# Patient Record
Sex: FEMALE | Employment: OTHER | URBAN - METROPOLITAN AREA
[De-identification: names, ages, dates, MRNs, and addresses within clinical notes are randomized per-mention and may not be internally consistent; named-entity substitution may affect disease eponyms.]

---

## 2019-02-06 ENCOUNTER — OFFICE VISIT (OUTPATIENT)
Dept: OBGYN | Facility: CLINIC | Age: 34
End: 2019-02-06
Attending: STUDENT IN AN ORGANIZED HEALTH CARE EDUCATION/TRAINING PROGRAM
Payer: COMMERCIAL

## 2019-02-06 VITALS
BODY MASS INDEX: 22.99 KG/M2 | SYSTOLIC BLOOD PRESSURE: 118 MMHG | DIASTOLIC BLOOD PRESSURE: 72 MMHG | HEIGHT: 67 IN | HEART RATE: 73 BPM | WEIGHT: 146.5 LBS

## 2019-02-06 DIAGNOSIS — Z12.4 CERVICAL CANCER SCREENING: ICD-10-CM

## 2019-02-06 DIAGNOSIS — R45.89 SADNESS: ICD-10-CM

## 2019-02-06 DIAGNOSIS — N85.2 LARGE UTERUS: Primary | ICD-10-CM

## 2019-02-06 DIAGNOSIS — Z11.3 ROUTINE SCREENING FOR STI (SEXUALLY TRANSMITTED INFECTION): ICD-10-CM

## 2019-02-06 DIAGNOSIS — Z23 NEED FOR PROPHYLACTIC VACCINATION AND INOCULATION AGAINST INFLUENZA: ICD-10-CM

## 2019-02-06 DIAGNOSIS — Z00.00 VISIT FOR PREVENTIVE HEALTH EXAMINATION: ICD-10-CM

## 2019-02-06 DIAGNOSIS — Z31.69 ENCOUNTER FOR PRECONCEPTION CONSULTATION: ICD-10-CM

## 2019-02-06 DIAGNOSIS — Z00.00 ENCOUNTER FOR ROUTINE ADULT HEALTH EXAMINATION WITHOUT ABNORMAL FINDINGS: ICD-10-CM

## 2019-02-06 PROCEDURE — 25000128 H RX IP 250 OP 636: Mod: ZF

## 2019-02-06 PROCEDURE — G0145 SCR C/V CYTO,THINLAYER,RESCR: HCPCS | Performed by: STUDENT IN AN ORGANIZED HEALTH CARE EDUCATION/TRAINING PROGRAM

## 2019-02-06 PROCEDURE — 87624 HPV HI-RISK TYP POOLED RSLT: CPT | Performed by: OBSTETRICS & GYNECOLOGY

## 2019-02-06 PROCEDURE — G0008 ADMIN INFLUENZA VIRUS VAC: HCPCS | Mod: ZF

## 2019-02-06 PROCEDURE — 87591 N.GONORRHOEAE DNA AMP PROB: CPT | Performed by: STUDENT IN AN ORGANIZED HEALTH CARE EDUCATION/TRAINING PROGRAM

## 2019-02-06 PROCEDURE — 90686 IIV4 VACC NO PRSV 0.5 ML IM: CPT | Mod: ZF

## 2019-02-06 PROCEDURE — 87661 TRICHOMONAS VAGINALIS AMPLIF: CPT | Performed by: STUDENT IN AN ORGANIZED HEALTH CARE EDUCATION/TRAINING PROGRAM

## 2019-02-06 PROCEDURE — 87491 CHLMYD TRACH DNA AMP PROBE: CPT | Performed by: STUDENT IN AN ORGANIZED HEALTH CARE EDUCATION/TRAINING PROGRAM

## 2019-02-06 PROCEDURE — G0463 HOSPITAL OUTPT CLINIC VISIT: HCPCS | Mod: 25

## 2019-02-06 RX ORDER — PRENATAL VIT/IRON FUM/FOLIC AC 27MG-0.8MG
1 TABLET ORAL DAILY
Qty: 90 TABLET | Refills: 3 | Status: SHIPPED | OUTPATIENT
Start: 2019-02-06 | End: 2020-02-17

## 2019-02-06 SDOH — HEALTH STABILITY: MENTAL HEALTH: HOW MANY STANDARD DRINKS CONTAINING ALCOHOL DO YOU HAVE ON A TYPICAL DAY?: 3 OR 4

## 2019-02-06 SDOH — HEALTH STABILITY: PHYSICAL HEALTH: ON AVERAGE, HOW MANY MINUTES DO YOU ENGAGE IN EXERCISE AT THIS LEVEL?: 60 MIN

## 2019-02-06 SDOH — SOCIAL STABILITY: SOCIAL NETWORK
IN A TYPICAL WEEK, HOW MANY TIMES DO YOU TALK ON THE PHONE WITH FAMILY, FRIENDS, OR NEIGHBORS?: MORE THAN THREE TIMES A WEEK

## 2019-02-06 SDOH — SOCIAL STABILITY: SOCIAL NETWORK: ARE YOU MARRIED, WIDOWED, DIVORCED, SEPARATED, NEVER MARRIED, OR LIVING WITH A PARTNER?: MARRIED

## 2019-02-06 SDOH — HEALTH STABILITY: MENTAL HEALTH: HOW OFTEN DO YOU HAVE 6 OR MORE DRINKS ON ONE OCCASION?: LESS THAN MONTHLY

## 2019-02-06 SDOH — HEALTH STABILITY: MENTAL HEALTH: HOW OFTEN DO YOU HAVE A DRINK CONTAINING ALCOHOL?: 4 OR MORE TIMES A WEEK

## 2019-02-06 SDOH — SOCIAL STABILITY: SOCIAL NETWORK: HOW OFTEN DO YOU ATTEND CHURCH OR RELIGIOUS SERVICES?: NEVER

## 2019-02-06 SDOH — SOCIAL STABILITY: SOCIAL NETWORK
DO YOU BELONG TO ANY CLUBS OR ORGANIZATIONS SUCH AS CHURCH GROUPS UNIONS, FRATERNAL OR ATHLETIC GROUPS, OR SCHOOL GROUPS?: NO

## 2019-02-06 SDOH — HEALTH STABILITY: PHYSICAL HEALTH: ON AVERAGE, HOW MANY DAYS PER WEEK DO YOU ENGAGE IN MODERATE TO STRENUOUS EXERCISE (LIKE A BRISK WALK)?: 2 DAYS

## 2019-02-06 SDOH — SOCIAL STABILITY: SOCIAL NETWORK: HOW OFTEN DO YOU GET TOGETHER WITH FRIENDS OR RELATIVES?: NEVER

## 2019-02-06 SDOH — SOCIAL STABILITY: SOCIAL NETWORK: HOW OFTEN DO YOU ATTENT MEETINGS OF THE CLUB OR ORGANIZATION YOU BELONG TO?: NEVER

## 2019-02-06 ASSESSMENT — PATIENT HEALTH QUESTIONNAIRE - PHQ9
SUM OF ALL RESPONSES TO PHQ QUESTIONS 1-9: 19
5. POOR APPETITE OR OVEREATING: SEVERAL DAYS

## 2019-02-06 ASSESSMENT — ANXIETY QUESTIONNAIRES
GAD7 TOTAL SCORE: 11
5. BEING SO RESTLESS THAT IT IS HARD TO SIT STILL: NOT AT ALL
7. FEELING AFRAID AS IF SOMETHING AWFUL MIGHT HAPPEN: SEVERAL DAYS
3. WORRYING TOO MUCH ABOUT DIFFERENT THINGS: NEARLY EVERY DAY
2. NOT BEING ABLE TO STOP OR CONTROL WORRYING: NEARLY EVERY DAY
6. BECOMING EASILY ANNOYED OR IRRITABLE: SEVERAL DAYS
1. FEELING NERVOUS, ANXIOUS, OR ON EDGE: MORE THAN HALF THE DAYS

## 2019-02-06 ASSESSMENT — MIFFLIN-ST. JEOR: SCORE: 1406.02

## 2019-02-06 NOTE — NURSING NOTE
FLU VACCINE QUESTIONNAIRE:  Ask the following questions of all parties who want influenza vaccination:     CONTRAINDICATIONS  1.  Is the patient age less than 6 months?  NO  2.  Has the person to be vaccinated ever had Guillain-Needham syndrome? NO  3.  Has the person to be vaccinated had the vaccine this year? NO  4.  Is the person to be vaccinated sick today? NO  5.  Does the person to be vaccinated have an allergy to eggs or a component of the vaccine? NO  6.  Has the person to vaccinated ever had a serious reaction to an influenza vaccination in the past? NO                             INFLUENZA VACCINATION NOTE      Information sheet given to patient and questions answered.

## 2019-02-06 NOTE — PATIENT INSTRUCTIONS
prescription for prenatal vitamins.  Timed intercourse using phone jake for cycle tracking. Can use ovulation predictor kits.  Schedule ultrasound and repeat appointment on 3/14/19.   Make an appointment to see a counselor. [email or web address]       PREVENTIVE HEALTH RECOMMENDATIONS:   Most women need a yearly breast and pelvic exam.    A PAP screen, a test done DURING a pelvic exam, is NO longer recommended yearly.    March 2013, screening guidelines recommended by ACOG for PAP screen are:    1) First pap at age 21.    2) Pap every 3 years until age 30.    3) After age 30, pap every 3 years or Pap with HR HPV screen every 5 years until age 65.  4) Women do NOT need a vaginal Pap screen after a hysterectomy (surgical removal of the uterus) when they have not had cancer.    Exceptions:  1) Yearly pap if HIV+ or immunosuppressed secondary to organ transplant  2) ERIN II-III continue routine screening for 20 years.    I encourage you continue looking for opportunities to choose a healthy lifestyle:       * Choose to eat a heart healthy diet. Check out the FOOD PLATE guidelines at: http://www.choosemyplate.gov/ for helpful hints on weight and cholesterol management.  Balance your caloric intake with exercise to maintain a BMI in the 22 to 26 range. For bone health: Eat calcium-rich foods like yogurt, broccoli or take chewable calcium pills (500 to 600 mg) twice a day with food.       * Exercise for at least an average of 30 minutes a day, 5 days of the week. This will help you control your weight, release stress, and help prevent disease.      * Take a Vitamin D3 supplement daily fall through spring and during summer unless you [id number]' full body sun exposure to skin without sunscreen.      * DO wear sunscreen to prevent skin cancer after the first 15-30 minutes.      * Identify stressors in your life, find ways to release the stress, and, make time for yourself. PLEASE ask for help if mood  changes last longer than two weeks.     * Limit alcohol to one drink per day.  No smoking.  Avoid second hand smoke. If you smoke, ask for help to stop.       *  If you are in a sexual relationship, talk with your partner about possible infection risks and take action to protect yourself from exposure to a sexual infection.    Please request an infection screen for STIs (sexually transmitted infections) if you are less than age 26 OR believe that you may be at risk.     Get a flu shot each year. Get a tetanus shot every 10 years. EVERYONE needs a pertussis (Whooping cough) booster.    See your dentist twice a year for an exam and preventive care cleaning.     Consider the following screen tests:    1) cholesterol test every 5 years.     2) yearly mammogram after age 40 unless you have identified risks.    3) colonoscopy every 10 years after age 50 unless you have identified risks.    4) diabetes blood test screening if you are at risk for diabetes.      Additional information that you may also find helpful:  The Internet now gives us access to LOTS of information -- some of it helpful, research documented and also plenty of harmful, anecdotal information that may not pertain to your situtaion. Consider visiting the following websites for accurate health information:    www.vitamindcouncil.org/ : Info and ongoing research re Vitamin D    www.fairview.org : Up to date and easily searchable information on multiple topics.    www.medlineplus.gov : medication info, interactive tutorials, watch real surgeries online    www.cdc.gov : public health info, travel advisories, epidemics (H1N1)    www.artis/std.org: current research re diagnosis, treatment and prevention of sexually contacted infections.    www.health.[facility].[place] : [place] dept of heatlh, public health issues in [place], N1N1    www.familydoctor.org : good info from the Academy of Family Physicians

## 2019-02-06 NOTE — PROGRESS NOTES
"[facility]  Annual Exam    HPI:    John Guaman is a 33 year old , here for well woman exam.  She is here with her . Her main concerns today revolve around her lack of medical care in [place] and preconception/fertility. She overall is unsure of her health history including specifics of surgeries, immunizations, etc. [place] the [place] and has been having unprotected intercourse since then. She has just been starting to track her cycles. Her  is 57 and has two children who are in their 20s and 30s. She has had one pregnancy that was terminated. She has regular monthly cycles that last 4-5 days, normal flow. She had some sort of gyn surgery at the same time as an appendectomy. She thinks it might have been for \"a cyst.\"      GYN History  - LMP: Patient's last menstrual period was 2019 (exact date).  - Menses: cycles q month, lasting 4-5 days, with moderate flow  - Pap Smears: Possible history of one previous pap smear, unsure of results  - Contraception: never used  - Sexual Activity: currently having unprotected sex with usband  - Hx STIs: denies h/o chlamydia, gonorrhea, syphilis, hepatitis, HIV  - Hx UTIs: unsure    OBHx  Obstetric History       T0      L0     SAB0   TAB0   Ectopic0   Multiple0   Live Births0       # Outcome Date GA Lbr Antwon/2nd Weight Sex Delivery Anes PTL Lv   1 AB                   PMHx: Denies    PSHx:   Likely suction D&C for induced Ab  Open appendectomy and gyn surgery    Meds: Occasional multivitamins    Allergies:  NKDA    SocHx: Former smoker, does drink up to 16 drinks/week, no drugs  Immigrated from [place] (from [place], lived in [place] 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 Patient reports feeling worried about everything she has to do and sadness regarding her mother's death and her \"bad life.\"    Diet: Overall good  Exercise: Tries to walk and run when able    Physical Exam  /72 (BP Location: Right arm, Patient Position: Chair)   Pulse 73   Ht 1.7 m (5' 6.93\")   Wt 66.5 kg (146 lb 8 oz)   LMP 2019 (Exact Date)   Breastfeeding? No   BMI 22.99 kg/m    Gen: Well-appearing, NAD  HEENT: Normocephalic, atraumatic  Neck: Thyroid is not enlarged, no appreciable masses palpated. Non-tender  CV:  RRR, no m/r/g auscultated  Pulm: CTAB, no w/r/r auscultated  Abd: Well-healed scars x 3, ~4 cm infraumbilical midline and ~4cm RLQ, Soft, non-tender, non-distended  Ext: No LE edema, extremities warm and well perfused    Breast: Symmetric, no nipple discharge or retraction, no axillary lymphadenopathy, no palpable nodules or masses bilaterally, ~1 mm skin tag on right nipple    Pelvic:  Normal appearing external female genitalia. Normal hair distribution. Vagina is without lesions. Minimal white discharge. Cervix nulliparous, no lesions, no cervical motion tenderness. Uterus is ~12 wk size, out of the pelvis, mobile, non-tender. No adnexal tenderness or masses      --Ideal BMI: 18.5-24.9  Current BMI: Body mass index is 22.99 kg/m .  --Underweight = <18.5  --Normal weight = 18.5-24.9  --Overweight = 25-29.9  --Obesity = >30    Assessment/Plan  John Guaman is a 33 year old  female here for annual exam. Concerns include catching up on health maintenance, pregnancy planning, and depression.    1. Routine Health Maintenance:   - Offered breast exam.  No concerning findings on pelvic or breast exam.  Discussed SBE recommendations.   - Counseled patient regarding diet and exercise including Vit D and calcium intake, aerobic exercise, and weight maintenance.    - Immunizations: influenza " done today, discussed Gardisil recommendations-encouraged pt/ to talk to their insurance to see if it is covered. As patient is going through immigration process, she will likely need to catch up on immunizations. Informed patient to call for an RN visit when they know what she needs.   - GC/CT/Trich PCR     2. Cervical cancer screening:  - Pap w/Co-testing done today    3. Depression/Anxiety   -  I offered referral for therapy, patient agreeable. Information given. Discussed possibility of starting antidepressant/anti-anxiety medicine in the future if needed.    4. Pregnancy planning  - discussed expected fertility at patient's age and that we generally do not recommend testing until diagnosis of infertility made (no pregnancy after 1 year of unprotected intercourse.) Could consider earlier SA as partner is 57.   - encouraged fertility tracking jake such as [employer] and OPKs if desired  - rx for PNV sent   - will get pelvic ultrasound (see below)    5. Enlarged uterus/hx of gyn surgery  - due to enlarged uterus on exam today and history of unknown gyn surgery, pelvic ultrasound ordered to evaluate gyn anatomy and look to see if both ovaries still present, look for fibroids, etc.     Follow Up: after pelvic ultrasound to discuss results    Patient staffed with Dr. King    Call with results.     Oswaldo Carrillo MD  Ob/Gyn, PGY-2  2/7/2019 2:31 PM    The Patient was seen in [facility] by OSWALDO CARRILLO.  I reviewed the history & exam. Assessment and plan were jointly made.    Celia King MD

## 2019-02-06 NOTE — NURSING NOTE
Chief Complaint   Patient presents with     hospitals Care     Est. care/annual today. Will like to discuss previous surgery that she had. Doesn't remember the procedure. Also wants to know if she's able to get pregnant.       See WARREN Sotomayor 2/6/2019

## 2019-02-06 NOTE — LETTER
"2019       RE: John Guaman  [address]  [facility] [address]     Dear Colleague,    Thank you for referring your patient, John Guaman, to the [facility] at [facility]. Please see a copy of my visit note below.    [facility]  Annual Exam    HPI:    John Guaman is a 33 year old , here for well woman exam.  She is here with her . Her main concerns today revolve around her lack of medical care in her home country of [place] [place] [facility] and preconception/fertility. She overall is unsure of her health history including specifics of surgeries, immunizations, etc. [place] and has been having unprotected intercourse since then. She has just been starting to track her cycles. Her  is 57 and has two children who are in their 20s and 30s. She has had one pregnancy that was terminated. She has regular monthly cycles that last 4-5 days, normal flow. She had some sort of gyn surgery at the same time as an appendectomy. She thinks it might have been for \"a cyst.\"      GYN History  - LMP: Patient's last menstrual period was 2019 (exact date).  - Menses: cycles q month, lasting 4-5 days, with moderate flow  - Pap Smears: Possible history of one previous pap smear, unsure of results  - Contraception: never used  - Sexual Activity: currently having unprotected sex with usband  - Hx STIs: denies h/o chlamydia, gonorrhea, syphilis, hepatitis, HIV  - Hx UTIs: unsure    OBHx  Obstetric History       T0      L0     SAB0   TAB0   Ectopic0   Multiple0   Live Births0       # Outcome Date GA Lbr Antwon/2nd Weight Sex Delivery Anes PTL Lv   1 AB                   PMHx: Denies    PSHx:   Likely suction D&C for induced Ab  Open appendectomy and gyn surgery    Meds: Occasional multivitamins    Allergies:  NKDA    SocHx: Former smoker, does drink up to 16 drinks/week, no drugs  Immigrated from [place] (from " [place], lived in [place],) 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 Patient reports feeling worried about everything she has to do and sadness regarding her mother's death and her \"bad life.\"    Diet: Overall good  Exercise: Tries to walk and run when able    Physical Exam  /72 (BP Location: Right arm, Patient Position: Chair)   Pulse 73   Ht 1.7 m (5' 6.93\")   Wt 66.5 kg (146 lb 8 oz)   LMP 2019 (Exact Date)   Breastfeeding? No   BMI 22.99 kg/m     Gen: Well-appearing, NAD  HEENT: Normocephalic, atraumatic  Neck: Thyroid is not enlarged, no appreciable masses palpated. Non-tender  CV:  RRR, no m/r/g auscultated  Pulm: CTAB, no w/r/r auscultated  Abd: Well-healed scars x 3, ~4 cm infraumbilical midline and ~4cm RLQ, Soft, non-tender, non-distended  Ext: No LE edema, extremities warm and well perfused    Breast: Symmetric, no nipple discharge or retraction, no axillary lymphadenopathy, no palpable nodules or masses bilaterally, ~1 mm skin tag on right nipple    Pelvic:  Normal appearing external female genitalia. Normal hair distribution. Vagina is without lesions. Minimal white discharge. Cervix nulliparous, no lesions, no cervical motion tenderness. Uterus is ~12 wk size, out of the pelvis, mobile, non-tender. No adnexal tenderness or masses      --Ideal BMI: 18.5-24.9  Current BMI: Body mass index is 22.99 kg/m .  --Underweight = <18.5  --Normal weight = 18.5-24.9  --Overweight = 25-29.9  --Obesity = >30    Assessment/Plan  John Guaman is a 33 year old  female here for annual exam. Concerns include catching up on health maintenance, pregnancy planning, and depression.    1. " Routine Health Maintenance:   - Offered breast exam.  No concerning findings on pelvic or breast exam.  Discussed SBE recommendations.   - Counseled patient regarding diet and exercise including Vit D and calcium intake, aerobic exercise, and weight maintenance.    - Immunizations: influenza done today, discussed Gardisil recommendations-encouraged pt/ to talk to their insurance to see if it is covered. As patient is going through immigration process, she will likely need to catch up on immunizations. Informed patient to call for an RN visit when they know what she needs.   - GC/CT/Trich PCR     2. Cervical cancer screening:  - Pap w/Co-testing done today    3. Depression/Anxiety   -  I offered referral for therapy, patient agreeable. Information given. Discussed possibility of starting antidepressant/anti-anxiety medicine in the future if needed.    4. Pregnancy planning  - discussed expected fertility at patient's age and that we generally do not recommend testing until diagnosis of infertility made (no pregnancy after 1 year of unprotected intercourse.) Could consider earlier SA as partner is 57.   - encouraged fertility tracking jake such as [employer] and OPKs if desired  - rx for PNV sent   - will get pelvic ultrasound (see below)    5. Enlarged uterus/hx of gyn surgery  - due to enlarged uterus on exam today and history of unknown gyn surgery, pelvic ultrasound ordered to evaluate gyn anatomy and look to see if both ovaries still present, look for fibroids, etc.     Follow Up: after pelvic ultrasound to discuss results    Patient staffed with Dr. King    Call with results.     Oswaldo Carrillo MD  Ob/Gyn, PGY-2  2/7/2019 2:31 PM    The Patient was seen in [facility] by OSWALDO CARRILLO.  I reviewed the history & exam. Assessment and plan were jointly made.    Celia King MD        Again, thank you for allowing me to participate in the care of your patient.      Sincerely,    Oswaldo  MD Gerardo

## 2019-02-06 NOTE — LETTER
Date:February 22, 2019      Provider requested that no letter be sent. Do not send.       [facility] Health Information

## 2019-02-07 LAB
C TRACH DNA SPEC QL NAA+PROBE: NEGATIVE
N GONORRHOEA DNA SPEC QL NAA+PROBE: NEGATIVE
SPECIMEN SOURCE: NORMAL
SPECIMEN SOURCE: NORMAL

## 2019-02-08 LAB
COPATH REPORT: NORMAL
PAP: NORMAL

## 2019-02-08 ASSESSMENT — ANXIETY QUESTIONNAIRES: GAD7 TOTAL SCORE: 11

## 2019-02-11 LAB
FINAL DIAGNOSIS: NORMAL
HPV HR 12 DNA CVX QL NAA+PROBE: NEGATIVE
HPV16 DNA SPEC QL NAA+PROBE: NEGATIVE
HPV18 DNA SPEC QL NAA+PROBE: NEGATIVE
SPECIMEN DESCRIPTION: NORMAL
SPECIMEN SOURCE CVX/VAG CYTO: NORMAL

## 2019-02-12 NOTE — RESULT ENCOUNTER NOTE
Attempted to call patient with results multiple times, someone answers phone and then immediately hangs up. Results of Pap/HVP NILM HPV negative and Gc/Ch negative as well.     Cecilia Roberts MD   OB/GYN Resident PGY-2  2/12/2019 8:33 AM

## 2019-02-20 ENCOUNTER — TELEPHONE (OUTPATIENT)
Dept: OBGYN | Facility: CLINIC | Age: 34
End: 2019-02-20

## 2019-02-20 NOTE — TELEPHONE ENCOUNTER
Called patient with normal lab results. Patient expressed understanding and will come to clinic for US as planned next month

## 2019-03-14 ENCOUNTER — OFFICE VISIT (OUTPATIENT)
Dept: OBGYN | Facility: CLINIC | Age: 34
End: 2019-03-14
Attending: STUDENT IN AN ORGANIZED HEALTH CARE EDUCATION/TRAINING PROGRAM
Payer: COMMERCIAL

## 2019-03-14 ENCOUNTER — HOSPITAL ENCOUNTER (OUTPATIENT)
Dept: ULTRASOUND IMAGING | Facility: CLINIC | Age: 34
Discharge: HOME | End: 2019-03-14
Attending: STUDENT IN AN ORGANIZED HEALTH CARE EDUCATION/TRAINING PROGRAM | Admitting: STUDENT IN AN ORGANIZED HEALTH CARE EDUCATION/TRAINING PROGRAM
Payer: COMMERCIAL

## 2019-03-14 VITALS
SYSTOLIC BLOOD PRESSURE: 117 MMHG | DIASTOLIC BLOOD PRESSURE: 75 MMHG | BODY MASS INDEX: 23.54 KG/M2 | WEIGHT: 150 LBS | HEART RATE: 73 BPM | HEIGHT: 67 IN

## 2019-03-14 DIAGNOSIS — Z31.9 DESIRE FOR PREGNANCY: ICD-10-CM

## 2019-03-14 DIAGNOSIS — N85.2 LARGE UTERUS: ICD-10-CM

## 2019-03-14 DIAGNOSIS — D25.9 UTERINE LEIOMYOMA, UNSPECIFIED LOCATION: Primary | ICD-10-CM

## 2019-03-14 PROCEDURE — G0463 HOSPITAL OUTPT CLINIC VISIT: HCPCS

## 2019-03-14 PROCEDURE — 76830 TRANSVAGINAL US NON-OB: CPT

## 2019-03-14 ASSESSMENT — MIFFLIN-ST. JEOR: SCORE: 1421.91

## 2019-03-14 NOTE — LETTER
Date:March 25, 2019      Patient was self referred, no letter generated. Do not send.        [facility] Health Information

## 2019-03-14 NOTE — PATIENT INSTRUCTIONS
Keep timing periods and intercourse.  You can buy and use ovulation predictor kits.    Anam can call [facility] to set up semen analysis. I can call with results.    Return to clinic when pregnant, in 6 months if not pregnant, or sooner if you need to.

## 2019-03-14 NOTE — LETTER
"3/14/2019       RE: John Guaman  [address] [place] [id number]     Dear Colleague,    Thank you for referring your patient, John Guaman, to the [facility] at [facility]. Please see a copy of my visit note below.    [facility]  Follow-Up Visit    S: Ms. John Guaman is a 33 year old  here to discuss test results. Her concerns today include ongoing planning and trying for pregnancy. Her periods continue to be regular. Since seeing me, she had one cycle on  and one on 3/4. They each lasted 6 days. Mood is overall stable, still having anxiety and sadness due to stressors stemming from leaving home and family relationships. Has appt with counselor set up on 3/25.     O:  /75   Pulse 73   Ht 1.7 m (5' 6.93\")   Wt 68 kg (150 lb)   LMP 2019   BMI 23.54 kg/m     Gen: Well-appearing, NAD  HEENT: Normocephalic, atraumatic  CV:  Regular rate   Pulm: Normal respiratory effort     Labs:  19 Pap NIL, HPV neg, neg GC/CT    Imaging:    :  TVUS 3/14/19  The uterus measures 12.0 x 9.1 x 9.9 cm. There are multiple fibroids.  The first in the right uterine wall measuring approximately 5.8 x 4.9  x 4.6 cm. The second in the left uterine wall measuring 7.6 x 6.1 x  6.3 cm. The third in the uterine fundus measuring approximately 1.9 x  1.4 x 1.5 cm. The fibroids partially obscure the endometrium.  Visualized portion of the endometrium in the fundus measures  approximately 8 mm. There is a small nabothian cysts the level of the  cervix. There is no free fluid in the pelvis.     The right ovary measures 4.4 x 4.0 x 4.1 cm. There are 2 anechoic,  avascular cysts in the right ovary. The first measuring 3.0 x 2.4 x  2.9 cm. A second measuring 2.5 x 1.0 x 1.3 cm. The left ovary measures  3.5 x 3.0 x 2.3 cm. There is a 2.1 x 1.6 x 1.3 cm simple cyst in the  left ovary. There is no adnexal mass. There is normal blood flow to  the " ovaries.                                                                      IMPRESSION:   1. Large, myomatous uterus with effacement and obscuration of the  endometrial stripe.  2. Simple appearing adnexal follicle/cysts.     A/P:  Ms. John Guaman is a 33 year old  here for results and continued discussion of pregnancy plans.  - reviewed normal Pap/HPV testing and STI tests. Recommended repeat co-testing in 5 years  - reviewed findings of fibroids on ultrasound. Patient's menses are regular with a normal flow. No bulk sx. Reviewed no need to treat fibroids if asymptomatic, particularly as most treatments are also contraception, and she is actively trying to conceive now  - discussed that fibroids can interfere with fertility, but most of the time they do not  - reviewed plans for pregnancy with patient and partner. They have been practicing timed intercourse for about two months and have been having unprotected intercourse for 6 months. Reviewed that no testing is indicated until dx of infertility is made (no pregnancy after 1 year of unprotected intercourse for a pt her age.) As Anam, patient's partner is 57, it is reasonable for him to have a semen analysis now. Order placed, will call with results. Recommended patient follow-up with me in 6 months if not pregnant.  - patient curious about douching. Reviewed recommendations against douching. Patient denies any bothersome discharge or other symptoms.  - discussed safety and mood with John without Anam in the room. Patient reports feeling safe at home and overall happy with her decision to come here. Says she met Anam when he worked in [facility] and has known him for 5 years. Spends her days in school and exercises daily.    Staffed with Dr. Heard.    Rochelle Foreman MD  Ob/Gyn, PGY-2  3/14/2019, 8:47 PM    The Patient was seen in [facility] by ROCHELLE FOREMAN.  I reviewed the history & exam. Assessment and plan were jointly made. Given  size of myoma greater than 5 cm and possible impact/effacement of endometrium with myomas would recommend treatment of myomas, possible myomectomy if no pregnancy after 1 year.    Helen Heard MD          Again, thank you for allowing me to participate in the care of your patient.      Sincerely,    Rochelle Carrillo MD

## 2019-03-15 NOTE — PROGRESS NOTES
"[facility]  Follow-Up Visit    S: Ms. John Guaman is a 33 year old  here to discuss test results. Her concerns today include ongoing planning and trying for pregnancy. Her periods continue to be regular. Since seeing me, she had one cycle on  and one on 3/4. They each lasted 6 days. Mood is overall stable, still having anxiety and sadness due to stressors stemming from leaving home and family relationships. Has appt with counselor set up on 3/25.     O:  /75   Pulse 73   Ht 1.7 m (5' 6.93\")   Wt 68 kg (150 lb)   LMP 2019   BMI 23.54 kg/m    Gen: Well-appearing, NAD  HEENT: Normocephalic, atraumatic  CV:  Regular rate   Pulm: Normal respiratory effort     Labs:  19 Pap NIL, HPV neg, neg GC/CT    Imaging:    :  TVUS 3/14/19  The uterus measures 12.0 x 9.1 x 9.9 cm. There are multiple fibroids.  The first in the right uterine wall measuring approximately 5.8 x 4.9  x 4.6 cm. The second in the left uterine wall measuring 7.6 x 6.1 x  6.3 cm. The third in the uterine fundus measuring approximately 1.9 x  1.4 x 1.5 cm. The fibroids partially obscure the endometrium.  Visualized portion of the endometrium in the fundus measures  approximately 8 mm. There is a small nabothian cysts the level of the  cervix. There is no free fluid in the pelvis.     The right ovary measures 4.4 x 4.0 x 4.1 cm. There are 2 anechoic,  avascular cysts in the right ovary. The first measuring 3.0 x 2.4 x  2.9 cm. A second measuring 2.5 x 1.0 x 1.3 cm. The left ovary measures  3.5 x 3.0 x 2.3 cm. There is a 2.1 x 1.6 x 1.3 cm simple cyst in the  left ovary. There is no adnexal mass. There is normal blood flow to  the ovaries.                                                                      IMPRESSION:   1. Large, myomatous uterus with effacement and obscuration of the  endometrial stripe.  2. Simple appearing adnexal follicle/cysts.     A/P:  Ms. John Guaman is a 33 year old  here for results and " continued discussion of pregnancy plans.  - reviewed normal Pap/HPV testing and STI tests. Recommended repeat co-testing in 5 years  - reviewed findings of fibroids on ultrasound. Patient's menses are regular with a normal flow. No bulk sx. Reviewed no need to treat fibroids if asymptomatic, particularly as most treatments are also contraception, and she is actively trying to conceive now  - discussed that fibroids can interfere with fertility, but most of the time they do not  - reviewed plans for pregnancy with patient and partner. They have been practicing timed intercourse for about two months and have been having unprotected intercourse for 6 months. Reviewed that no testing is indicated until dx of infertility is made (no pregnancy after 1 year of unprotected intercourse for a pt her age.) As Anam, patient's partner is 57, it is reasonable for him to have a semen analysis now. Order placed, will call with results. Recommended patient follow-up with me in 6 months if not pregnant.  - patient curious about douching. Reviewed recommendations against douching. Patient denies any bothersome discharge or other symptoms.  - discussed safety and mood with John without Anam in the room. Patient reports feeling safe at home and overall happy with her decision to come here. Says she met Anam when he worked in [facility] and has known him for 5 years. Spends her days in school and exercises daily.    Staffed with Dr. Heard.    Rochelle Carrillo MD  Ob/Gyn, PGY-2  3/14/2019, 8:47 PM    The Patient was seen in [facility] by ROCHELLE CARRILOL.  I reviewed the history & exam. Assessment and plan were jointly made. Given size of myoma greater than 5 cm and possible impact/effacement of endometrium with myomas would recommend treatment of myomas, possible myomectomy if no pregnancy after 1 year.    Helen Heard MD

## 2019-03-25 ENCOUNTER — OFFICE VISIT (OUTPATIENT)
Dept: PSYCHOLOGY | Facility: CLINIC | Age: 34
End: 2019-03-25
Attending: PSYCHOLOGIST
Payer: COMMERCIAL

## 2019-03-25 DIAGNOSIS — F43.20 ADJUSTMENT DISORDER, UNSPECIFIED TYPE: Primary | ICD-10-CM

## 2019-03-25 PROCEDURE — 90791 PSYCH DIAGNOSTIC EVALUATION: CPT | Mod: ZP | Performed by: PSYCHOLOGIST

## 2019-03-25 PROCEDURE — 40000114 ZZH STATISTIC NO CHARGE CLINIC VISIT
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
## 2019-04-01 ENCOUNTER — OFFICE VISIT (OUTPATIENT)
Dept: PSYCHOLOGY | Facility: CLINIC | Age: 34
End: 2019-04-01
Attending: PSYCHOLOGIST
Payer: COMMERCIAL

## 2019-04-01 DIAGNOSIS — F43.20 ADJUSTMENT DISORDER, UNSPECIFIED TYPE: Primary | ICD-10-CM

## 2019-04-01 PROCEDURE — 90834 PSYTX W PT 45 MINUTES: CPT | Mod: ZP | Performed by: PSYCHOLOGIST

## 2019-04-01 NOTE — PROGRESS NOTES
"Psychology Progress Note #2.  Seen for 50 min.  INDIV TH   Interv: pr solving, support.  DX F43.20  [place]  High stress  S \"think about my brother and sisters, my mother. . They have children, he doesn't. . Want him to come, needs to complete his diploma the end of this month\".  \"Know they are not happy. . Would like my sisters to have a home\"  O  Worried about siblings.  Sad regarding losses, separations.  A  Discussed possibilities she might be able to seek, e.g., continued language education, volunteering to strengthen communication, drivers' permit, social network.  These will all depend on the support of her .  GOALS.  Ask teacher about volunteering,  Consider goals she has for herself,.  PLAN  RT 2 wks.  TP  3.25.2019  "

## 2019-04-21 ENCOUNTER — TELEPHONE (OUTPATIENT)
Dept: OBGYN | Facility: CLINIC | Age: 34
End: 2019-04-21

## 2019-04-22 NOTE — TELEPHONE ENCOUNTER
Telephone call.    Pt called c/o left sided abdominal pain for last 3 days.  No recent BM.  Wants to be seen in clinic.  Encouraged pt to call in morning and request an appointment with Dr. Carrillo.    Helen Frank MD

## 2019-08-09 ENCOUNTER — OFFICE VISIT (OUTPATIENT)
Dept: OBGYN | Facility: CLINIC | Age: 34
End: 2019-08-09
Attending: STUDENT IN AN ORGANIZED HEALTH CARE EDUCATION/TRAINING PROGRAM
Payer: COMMERCIAL

## 2019-08-09 VITALS
SYSTOLIC BLOOD PRESSURE: 107 MMHG | WEIGHT: 152.4 LBS | HEIGHT: 66 IN | HEART RATE: 58 BPM | DIASTOLIC BLOOD PRESSURE: 70 MMHG | BODY MASS INDEX: 24.49 KG/M2

## 2019-08-09 DIAGNOSIS — Z31.41 ENCOUNTER FOR FERTILITY TESTING: ICD-10-CM

## 2019-08-09 PROCEDURE — G0463 HOSPITAL OUTPT CLINIC VISIT: HCPCS | Mod: ZF

## 2019-08-09 ASSESSMENT — MIFFLIN-ST. JEOR: SCORE: 1418.03

## 2019-08-09 NOTE — NURSING NOTE
Chief Complaint   Patient presents with     Consult     Discuss infertility       See WARREN Sotomayor 8/9/2019

## 2019-08-09 NOTE — PATIENT INSTRUCTIONS
The first day of your period is Day #1.    There are several labs you will come to have drawn on Day #21. This day is a Sunday so come on Day #22, 8/26.     There is one lab called Progesterone that you need to wait to have drawn on Day #3 of your next period.     Schedule hysterosalpingogram. This is an Xray test to evaluation your tubes and ovary. You can schedule this for around Day #7. Schedule today for this cycle, or call on Day #1 of your next cycle to have it done that cycle. You can continue to have unprotected intercourse.    Schedule an appointment with me in 6-7 weeks to review results.

## 2019-08-13 ENCOUNTER — APPOINTMENT (OUTPATIENT)
Dept: LAB | Facility: CLINIC | Age: 34
End: 2019-08-13
Payer: COMMERCIAL

## 2019-08-13 ENCOUNTER — HOSPITAL ENCOUNTER (OUTPATIENT)
Dept: GENERAL RADIOLOGY | Facility: CLINIC | Age: 34
Discharge: HOME | End: 2019-08-13
Attending: STUDENT IN AN ORGANIZED HEALTH CARE EDUCATION/TRAINING PROGRAM | Admitting: STUDENT IN AN ORGANIZED HEALTH CARE EDUCATION/TRAINING PROGRAM
Payer: COMMERCIAL

## 2019-08-13 ENCOUNTER — ALLIED HEALTH/NURSE VISIT (OUTPATIENT)
Dept: OBGYN | Facility: CLINIC | Age: 34
End: 2019-08-13
Payer: COMMERCIAL

## 2019-08-13 DIAGNOSIS — Z31.41 ENCOUNTER FOR FERTILITY TESTING: ICD-10-CM

## 2019-08-13 DIAGNOSIS — Z01.812 PRE-PROCEDURE LAB EXAM: Primary | ICD-10-CM

## 2019-08-13 LAB
HCG UR QL: NEGATIVE
INTERNAL QC OK POCT: YES

## 2019-08-13 PROCEDURE — 25500064 ZZH RX 255 OP 636: Performed by: STUDENT IN AN ORGANIZED HEALTH CARE EDUCATION/TRAINING PROGRAM

## 2019-08-13 PROCEDURE — 81025 URINE PREGNANCY TEST: CPT | Mod: ZF

## 2019-08-13 PROCEDURE — 74740 X-RAY FEMALE GENITAL TRACT: CPT

## 2019-08-13 PROCEDURE — 25000125 ZZHC RX 250: Performed by: STUDENT IN AN ORGANIZED HEALTH CARE EDUCATION/TRAINING PROGRAM

## 2019-08-13 PROCEDURE — 40000269 ZZH STATISTIC NO CHARGE FACILITY FEE: Mod: ZF

## 2019-08-13 RX ORDER — IOPAMIDOL 510 MG/ML
50 INJECTION, SOLUTION INTRAVASCULAR ONCE
Status: COMPLETED | OUTPATIENT
Start: 2019-08-13 | End: 2019-08-13

## 2019-08-13 RX ADMIN — IOPAMIDOL 100 ML: 510 INJECTION, SOLUTION INTRAVASCULAR at 14:26

## 2019-08-13 RX ADMIN — LIDOCAINE HYDROCHLORIDE 5 ML: 10 INJECTION, SOLUTION EPIDURAL; INFILTRATION; INTRACAUDAL; PERINEURAL at 14:33

## 2019-08-13 NOTE — PROGRESS NOTES
"[facility]  Follow-Up Visit    S: Ms. John Guaman is a 33 year old  here for follow-up. She is here today with her  Anam. She initially presented last February to establish care and for preconception counseling. She returns today as she has now been having unprotected intercourse for almost a year (since 2018) and has not had a pregnancy yet. She continues to have regular periods and some symptoms associated with ovulation, though she is unable to tell me what these are. We have discussed timed intercourse and OPKs at previous visits. She has loosely been practicing timed intercourse, though her her  says intercourse is \"more like when the mood hits.\" They were unable to give a better estimation of their frequency of intercourse. She has not used OPKs. Anam had a semen analysis a few months ago, which was normal. He is asking about Viagra for himself or female Viagra.    Without Anam in the room, John shared that she just hasn't been in the mood for intercourse much.  She still feels a lot of stress due to \"family issues\" and being far from home. She has not made many connections in the [place] yet. She is on a summer break from school. She has tried to connect with communities here, especially with those from her country. She has tried counseling, and feels like it was somewhat helpful, but does not want to go back. She does feel safe and cared for at home and tells me she wants to have a baby and that her infertility has contributed to her stress.     O:  /70 (BP Location: Left arm, Patient Position: Chair)   Pulse 58   Ht 1.676 m (5' 6\")   Wt 69.1 kg (152 lb 6.4 oz)   LMP 2019 (Exact Date)   Breastfeeding? No   BMI 24.60 kg/m    Gen: Well-appearing, NAD  HEENT: Normocephalic, atraumatic  CV:  Regular rate, well-perfused  Pulm: Normal respiratory effort    A/P:  Ms. John Guaman is a 33 year old  here for follow-up.    1. Infertility  - John and " Anam have now been having unprotected intercourse for roughly one year, so will complete basic infertility work-up at this point.    Pelvic US 3/14/19, large myomatous uterus with effacement and obscuration of the endometrial stripe. Discussed indication for myomectomy. Will probably schedule in late September/early October after completion of work-up. Consider ZABRINA referral prior to myomectomy.     Semen analysis: normal    HSG ordered, timing discussed    Day 3 FSH, Day 21 Progesterone, TSH, AMH, Prolactin ordered  - again reiterated importance of timed intercourse to have a spontaneous pregnancy, discussed that I would not able to prescribe Viagra for Anam. Briefly discussed indications for Viagra, and recommended he see a physician to discuss if he is experiencing symptoms     2. Mental health  - counseled John on the interplay between mental health and sexual function. I stressed the importance of treating underlying issues as opposed to proceeding with a medication to help with sexual desire.  - we discussed options of medication for mental health, but she declined due to sexual side effects  - discussed options of trying counseling again, possibly with a different provider. Resources provided for finding one.     Return to clinic following HSG and labs to discuss next steps, likely ZABRINA referral and abdominal myomectomy.    Staffed with Dr. Almaguer.     Rochelle Carrillo MD  Ob/Gyn, PGY-3  8/09/2019, 1:40 PM  The Patient was seen in Resident Continuity Clinic by ROCHELLE CARRILLO.  I reviewed the history & exam. Assessment and plan were jointly made.    Shaunna Almaguer MD

## 2019-08-13 NOTE — NURSING NOTE
Chief Complaint   Patient presents with     Allied Health Visit     Patient presented to clinic for UPT Pre-Procedure. UPT result was Negative

## 2019-08-13 NOTE — PROCEDURES
Procedure: Hysterosalpingogram (HSG) Contrast Injection    Preoperative Diagnosis:  Fertility testing, Known fibroid uterus  Postoperative Diagnosis:  Fertility testing, Known fibroid uterus, concern for bilateral tubal occlusion    :  Oxana Zuniga MD  Anesthesia:  Local infiltration of 1% Xylocaine (2 ml)  Contrast:  ISOVUE-250 (68 ml)  Complications:  None    Procedure Description:  The patient was placed in the dorsolithotomy position and a single-hinged vaginal speculum was inserted.  The cervix was exposed and washed with Hibiclens antiseptic solution three times.  Local anesthesia was established with 1% Xylocaine injected into the anterior lip of the cervix utilizing a 22-gauge spinal needle.  The cervix was grasped with a single toothed tenaculum.  The HSG cannula was attached to a 60-cc syringe containing water-soluble contrast (ISOVUE-250) and the cannula was filled with contrast removing air from it.  The tip of the cannula was then introduced into the external cervical os with the cone of the cannula applied tightly to the cervix.  Contrast was injected to image the uterine cavity and fallopian tubes under fluoroscopic monitoring.  There was no obvious filling of fallopian tubes with initial 46 ml of contrast, and as such another 22 ml of contrast was used to attempt further imaging without change in imaging.           Findings:    Uterine cavity:  Globular, filling defects and distortion of uterine borders likely secondary to known fibroid uterus  Fallopian tubes:  Small filling in proximal right fallopian tube, however no continuous flow and no spillage visualized.  No filling of left fallopian tube visualized.    Impression:  Concern for bilateral tubal occlusion    Oxana Zuniga MD

## 2019-08-15 ENCOUNTER — TELEPHONE (OUTPATIENT)
Dept: OBGYN | Facility: CLINIC | Age: 34
End: 2019-08-15

## 2019-08-19 ENCOUNTER — TELEPHONE (OUTPATIENT)
Dept: OBGYN | Facility: CLINIC | Age: 34
End: 2019-08-19

## 2019-08-19 NOTE — TELEPHONE ENCOUNTER
John's spouse Anam calling with questions about labs ordered. Informed him she needs to get progesterone on day 21 of her cycle and she can get the rest of labs work done on day 3 . She also needs to be seen in clinic after all lab work completed.Pt indicated understanding and agreed with plan.

## 2019-08-26 DIAGNOSIS — Z31.41 ENCOUNTER FOR FERTILITY TESTING: ICD-10-CM

## 2019-08-26 LAB — PROGEST SERPL-MCNC: 8.4 NG/ML

## 2019-08-26 PROCEDURE — 36415 COLL VENOUS BLD VENIPUNCTURE: CPT | Performed by: STUDENT IN AN ORGANIZED HEALTH CARE EDUCATION/TRAINING PROGRAM

## 2019-08-26 PROCEDURE — 84144 ASSAY OF PROGESTERONE: CPT | Performed by: STUDENT IN AN ORGANIZED HEALTH CARE EDUCATION/TRAINING PROGRAM

## 2019-08-27 ENCOUNTER — TELEPHONE (OUTPATIENT)
Dept: OBGYN | Facility: CLINIC | Age: 34
End: 2019-08-27

## 2019-08-27 NOTE — TELEPHONE ENCOUNTER
Received call from pt spouse, Anam, stating their insurance does not cover anything for infertility and so they are getting bills for all the testing that's been done.  He is wondering if the remaining tests can be coded differently so insurance will cover them.    Nurse informed him there would need to be a reason to change the coding and if there is another reason (like pain) they would need to discuss with provider at next clinic visit.

## 2019-09-25 ENCOUNTER — TELEPHONE (OUTPATIENT)
Dept: OBGYN | Facility: CLINIC | Age: 34
End: 2019-09-25

## 2019-09-25 DIAGNOSIS — Z31.41 ENCOUNTER FOR FERTILITY TESTING: Primary | ICD-10-CM

## 2019-09-25 DIAGNOSIS — Z31.41 ENCOUNTER FOR FERTILITY TESTING: ICD-10-CM

## 2019-09-25 LAB
ANION GAP SERPL CALCULATED.3IONS-SCNC: 6 MMOL/L (ref 3–14)
BUN SERPL-MCNC: 10 MG/DL (ref 7–30)
CALCIUM SERPL-MCNC: 8.7 MG/DL (ref 8.5–10.1)
CHLORIDE SERPL-SCNC: 108 MMOL/L (ref 94–109)
CO2 SERPL-SCNC: 25 MMOL/L (ref 20–32)
CREAT SERPL-MCNC: 0.86 MG/DL (ref 0.52–1.04)
ERYTHROCYTE [DISTWIDTH] IN BLOOD BY AUTOMATED COUNT: 18.2 % (ref 10–15)
FSH SERPL-ACNC: 9.2 IU/L
GFR SERPL CREATININE-BSD FRML MDRD: 89 ML/MIN/{1.73_M2}
GLUCOSE SERPL-MCNC: 96 MG/DL (ref 70–99)
HCT VFR BLD AUTO: 29.8 % (ref 35–47)
HGB BLD-MCNC: 8.5 G/DL (ref 11.7–15.7)
MCH RBC QN AUTO: 18.9 PG (ref 26.5–33)
MCHC RBC AUTO-ENTMCNC: 28.5 G/DL (ref 31.5–36.5)
MCV RBC AUTO: 66 FL (ref 78–100)
PLATELET # BLD AUTO: 276 10E9/L (ref 150–450)
POTASSIUM SERPL-SCNC: 4 MMOL/L (ref 3.4–5.3)
PROLACTIN SERPL-MCNC: 9 UG/L (ref 3–27)
RBC # BLD AUTO: 4.5 10E12/L (ref 3.8–5.2)
SODIUM SERPL-SCNC: 139 MMOL/L (ref 133–144)
TSH SERPL DL<=0.005 MIU/L-ACNC: 0.55 MU/L (ref 0.4–4)
WBC # BLD AUTO: 5.4 10E9/L (ref 4–11)

## 2019-09-25 PROCEDURE — 80048 BASIC METABOLIC PNL TOTAL CA: CPT | Performed by: STUDENT IN AN ORGANIZED HEALTH CARE EDUCATION/TRAINING PROGRAM

## 2019-09-25 PROCEDURE — 83520 IMMUNOASSAY QUANT NOS NONAB: CPT | Performed by: STUDENT IN AN ORGANIZED HEALTH CARE EDUCATION/TRAINING PROGRAM

## 2019-09-25 PROCEDURE — 84146 ASSAY OF PROLACTIN: CPT | Performed by: STUDENT IN AN ORGANIZED HEALTH CARE EDUCATION/TRAINING PROGRAM

## 2019-09-25 PROCEDURE — 84443 ASSAY THYROID STIM HORMONE: CPT | Performed by: STUDENT IN AN ORGANIZED HEALTH CARE EDUCATION/TRAINING PROGRAM

## 2019-09-25 PROCEDURE — 83001 ASSAY OF GONADOTROPIN (FSH): CPT | Performed by: STUDENT IN AN ORGANIZED HEALTH CARE EDUCATION/TRAINING PROGRAM

## 2019-09-25 PROCEDURE — 85027 COMPLETE CBC AUTOMATED: CPT | Performed by: STUDENT IN AN ORGANIZED HEALTH CARE EDUCATION/TRAINING PROGRAM

## 2019-09-25 PROCEDURE — 36415 COLL VENOUS BLD VENIPUNCTURE: CPT | Performed by: STUDENT IN AN ORGANIZED HEALTH CARE EDUCATION/TRAINING PROGRAM

## 2019-09-27 LAB — MIS SERPL-MCNC: 1.74 NG/ML (ref 0.18–11.71)

## 2019-11-27 ENCOUNTER — OFFICE VISIT (OUTPATIENT)
Dept: OBGYN | Facility: CLINIC | Age: 34
End: 2019-11-27
Attending: STUDENT IN AN ORGANIZED HEALTH CARE EDUCATION/TRAINING PROGRAM
Payer: COMMERCIAL

## 2019-11-27 ENCOUNTER — PREP FOR PROCEDURE (OUTPATIENT)
Dept: OBGYN | Facility: CLINIC | Age: 34
End: 2019-11-27

## 2019-11-27 VITALS
BODY MASS INDEX: 24.53 KG/M2 | WEIGHT: 152 LBS | HEART RATE: 86 BPM | DIASTOLIC BLOOD PRESSURE: 67 MMHG | SYSTOLIC BLOOD PRESSURE: 102 MMHG

## 2019-11-27 DIAGNOSIS — D50.9 MICROCYTIC ANEMIA: ICD-10-CM

## 2019-11-27 DIAGNOSIS — D50.0 IRON DEFICIENCY ANEMIA DUE TO CHRONIC BLOOD LOSS: ICD-10-CM

## 2019-11-27 DIAGNOSIS — D25.1 INTRAMURAL LEIOMYOMA OF UTERUS: Primary | ICD-10-CM

## 2019-11-27 DIAGNOSIS — N93.9 ABNORMAL UTERINE BLEEDING: Primary | ICD-10-CM

## 2019-11-27 LAB
FERRITIN SERPL-MCNC: 3 NG/ML (ref 12–150)
IRON SATN MFR SERPL: 2 % (ref 15–46)
IRON SERPL-MCNC: 11 UG/DL (ref 35–180)
TIBC SERPL-MCNC: 553 UG/DL (ref 240–430)

## 2019-11-27 PROCEDURE — 83550 IRON BINDING TEST: CPT | Performed by: STUDENT IN AN ORGANIZED HEALTH CARE EDUCATION/TRAINING PROGRAM

## 2019-11-27 PROCEDURE — 83540 ASSAY OF IRON: CPT | Performed by: STUDENT IN AN ORGANIZED HEALTH CARE EDUCATION/TRAINING PROGRAM

## 2019-11-27 PROCEDURE — G0463 HOSPITAL OUTPT CLINIC VISIT: HCPCS | Mod: ZF

## 2019-11-27 PROCEDURE — 83021 HEMOGLOBIN CHROMOTOGRAPHY: CPT | Performed by: STUDENT IN AN ORGANIZED HEALTH CARE EDUCATION/TRAINING PROGRAM

## 2019-11-27 PROCEDURE — 82728 ASSAY OF FERRITIN: CPT | Performed by: STUDENT IN AN ORGANIZED HEALTH CARE EDUCATION/TRAINING PROGRAM

## 2019-11-27 PROCEDURE — 36415 COLL VENOUS BLD VENIPUNCTURE: CPT | Performed by: STUDENT IN AN ORGANIZED HEALTH CARE EDUCATION/TRAINING PROGRAM

## 2019-11-27 PROCEDURE — 83020 HEMOGLOBIN ELECTROPHORESIS: CPT | Performed by: STUDENT IN AN ORGANIZED HEALTH CARE EDUCATION/TRAINING PROGRAM

## 2019-11-27 RX ORDER — FERROUS SULFATE 7.5 MG/0.5
325 SYRINGE (EA) ORAL DAILY
Qty: 100 ML | Refills: 3 | Status: SHIPPED | OUTPATIENT
Start: 2019-11-27 | End: 2020-01-22

## 2019-11-27 RX ORDER — CEFAZOLIN SODIUM 1 G/3ML
1 INJECTION, POWDER, FOR SOLUTION INTRAMUSCULAR; INTRAVENOUS SEE ADMIN INSTRUCTIONS
Status: CANCELLED | OUTPATIENT
Start: 2019-11-27

## 2019-11-27 RX ORDER — PHENAZOPYRIDINE HYDROCHLORIDE 100 MG/1
200 TABLET, FILM COATED ORAL ONCE
Status: CANCELLED | OUTPATIENT
Start: 2019-11-27 | End: 2019-11-27

## 2019-11-27 RX ORDER — CEFAZOLIN SODIUM 2 G/100ML
2 INJECTION, SOLUTION INTRAVENOUS
Status: CANCELLED | OUTPATIENT
Start: 2019-11-27

## 2019-11-27 ASSESSMENT — PAIN SCALES - GENERAL: PAINLEVEL: NO PAIN (0)

## 2019-11-27 NOTE — LETTER
2019       RE: John Guaman  [address]  [facility] [address]     Dear Colleague,    Thank you for referring your patient, John Guaman, to the [facility] at [facility]. Please see a copy of my visit note below.    [facility]  Follow-Up Visit    S: Ms. John Guaman is a 34 year old  here for follow-up for infertility and fibroids. Since our last visit, she had an HSG that showed blockage of her tubes. She continued to have regular periods, but she does think they are getting heavier. She is having some wheezing/coughing that she is wondering might be asthma. She denies shortness of breath or dizziness.     O:  /67   Pulse 86   Wt 68.9 kg (152 lb)   LMP 2019   Breastfeeding No   BMI 24.53 kg/m     Gen: Well-appearing, NAD  HEENT: Normocephalic, atraumatic  CV:  Regular rate  Pulm: Normal respiratory effort  Abd: Soft, non-tender, non-distended    HSG 19:   IMPRESSION:   Myomatous uterus resulting in significant distortion and  overdistention of the endocervical and endometrial canal. Suspect  opacification of the right fallopian tube, without confident  visualization of intraperitoneal spill given extensive venous  intravasation.    Hgb 8.5   MCV 66  TSH 0.55  FSH 9.2    A/P:  Ms. John Guaman is a 34 year old  here for follow-up on test results related to infertility and fibroids.    1. Microcytic anemia  - likely ISAIAH, iron studies and Hgb electrophoresis ordered  - iron supplement prescribed, discussed importance of taking this    2. Infertility  - likely structural causes with combined cavitary abnormality and tubal factor as AMH is normal, and regular menses are c/w regular ovulation  - recommended ZABRINA referral, handout given    3. Fibroid uterus  - fibroids likely contributing to both infertility and anemia, recommend surgical removal via abdominal myomectomy. Discussed risks, benefits,  alternatives to myomectomy. Risks discussed include bleeding, infection, and injury to surrounding structures, risks of anesthesia, and VTE. Discussed possible need for future C-sections at 37-38 weeks. Patient has previous VML from unknown gynecologic surgery in [place], and patient would like us to use this incision and perform scar revision.   - scheduling worksheet filled out    4. Wheezing/cough  - recommended PCP visit to evaluate for asthma and other causes    Phone call for results. Patient staffed with Dr. King.     Oswaldo Carrillo MD  Ob/Gyn, PGY-3  11/27/2019    The Patient was seen in Resident Continuity Clinic by OSWALDO CARRILLO.  I reviewed the history & exam. Assessment and plan were jointly made.    Celia King MD

## 2019-11-27 NOTE — LETTER
12/3/2019         John Guaman   [address] [place] [id number]        Dear Ms. Guaman:    The results of your recent  were abnormal. It is recommended that you get a special lab to further evaluate your hemoglobin. I have ordered that test for you. You can get that lab drawn at your convenience.     Results for orders placed or performed in visit on 11/27/19   Ferritin     Status: Abnormal   Result Value Ref Range    Ferritin 3 (L) 12 - 150 ng/mL   Iron and iron binding capacity     Status: Abnormal   Result Value Ref Range    Iron 11 (L) 35 - 180 ug/dL    Iron Binding Cap 553 (H) 240 - 430 ug/dL    Iron Saturation Index 2 (L) 15 - 46 %   Hgb with reflex to ELP or RBC Solubility (Sickle Cell Screen)     Status: Abnormal   Result Value Ref Range    Hemoglobin A1 86.1 (L) 95.0 - 97.9 %    Hemoglobin A2 2.2 2.0 - 3.5 %    Hemoglobin F 0.4 0.0 - 2.1 %    Hemoglobin S Eval 0.0 0.0 - 0.0 %    Hemoglobin C 0.0 0.0 - 0.0 %    Hemoglobin E 0.0 0.0 - 0.0 %    Hemoglobin Other 11.3 (H) 0.0 - 0.0 %    HGB Abn Evaluation SEE NOTE     Sickle Cell Solubility Confirm Not Performed     Hemoglobin Capillary ELP Performed          Please note that test explanations are brief and do not reflect all diagnostic uses.  If you have any questions or concerns, please call the clinic at [phone].      Sincerely,      Shaunna Almaguer MD, FACOG  [facility]  OB/GYN    12/3/2019  3:15 PM

## 2019-11-27 NOTE — PATIENT INSTRUCTIONS
Anemia:  - go to lab today to have lab studies to evaluate the cause of anemia  - take iron every day! Mix it with orange juice for absorption and taste.  - take colace if you have constipation.  - Please call if you want to try to a pill instead of the liquid.    Asthma:  - schedule an appointment with primary care provider for asthma    Fibroids:  - we will work on scheduling a myomectomy for abnormal uterine bleeding  - call your insurance to discuss coverage    Infertility:  - make an appointment at an infertility clinic ([facility] or [facility]) to evaluate infertility

## 2019-12-02 ENCOUNTER — PREP FOR PROCEDURE (OUTPATIENT)
Dept: OBGYN | Facility: CLINIC | Age: 34
End: 2019-12-02

## 2019-12-02 DIAGNOSIS — N93.9 ABNORMAL UTERINE BLEEDING: Primary | ICD-10-CM

## 2019-12-03 ENCOUNTER — TELEPHONE (OUTPATIENT)
Dept: OBGYN | Facility: CLINIC | Age: 34
End: 2019-12-03

## 2019-12-03 DIAGNOSIS — D50.8 OTHER IRON DEFICIENCY ANEMIA: Primary | ICD-10-CM

## 2019-12-03 PROBLEM — N93.9 ABNORMAL UTERINE BLEEDING: Status: ACTIVE | Noted: 2019-12-03

## 2019-12-03 LAB
HGB A1 MFR BLD: 86.1 % (ref 95–97.9)
HGB A2 MFR BLD: 2.2 % (ref 2–3.5)
HGB C MFR BLD: 0 % (ref 0–0)
HGB E MFR BLD: 0 % (ref 0–0)
HGB F MFR BLD: 0.4 % (ref 0–2.1)
HGB FRACT BLD ELPH-IMP: ABNORMAL
HGB OTHER MFR BLD: 11.3 % (ref 0–0)
HGB S BLD QL SOLY: ABNORMAL
HGB S MFR BLD: 0 % (ref 0–0)
PATH INTERP BLD-IMP: ABNORMAL

## 2019-12-03 NOTE — PROGRESS NOTES
[facility]  Follow-Up Visit    S: Ms. John Guaman is a 34 year old  here for follow-up for infertility and fibroids. Since our last visit, she had an HSG that showed blockage of her tubes. She continued to have regular periods, but she does think they are getting heavier. She is having some wheezing/coughing that she is wondering might be asthma. She denies shortness of breath or dizziness.     O:  /67   Pulse 86   Wt 68.9 kg (152 lb)   LMP 2019   Breastfeeding No   BMI 24.53 kg/m    Gen: Well-appearing, NAD  HEENT: Normocephalic, atraumatic  CV:  Regular rate  Pulm: Normal respiratory effort  Abd: Soft, non-tender, non-distended    HSG 19:   IMPRESSION:   Myomatous uterus resulting in significant distortion and  overdistention of the endocervical and endometrial canal. Suspect  opacification of the right fallopian tube, without confident  visualization of intraperitoneal spill given extensive venous  intravasation.    Hgb 8.5   MCV 66  TSH 0.55  FSH 9.2    A/P:  Ms. John Guaman is a 34 year old  here for follow-up on test results related to infertility and fibroids.    1. Microcytic anemia  - likely ISAIAH, iron studies and Hgb electrophoresis ordered  - iron supplement prescribed, discussed importance of taking this    2. Infertility  - likely structural causes with combined cavitary abnormality and tubal factor as AMH is normal, and regular menses are c/w regular ovulation  - recommended [facility] referral, handout given    3. Fibroid uterus  - fibroids likely contributing to both infertility and anemia, recommend surgical removal via abdominal myomectomy. Discussed risks, benefits, alternatives to myomectomy. Risks discussed include bleeding, infection, and injury to surrounding structures, risks of anesthesia, and VTE. Discussed possible need for future C-sections at 37-38 weeks. Patient has previous VML from unknown gynecologic surgery in [place], and patient would like us to  use this incision and perform scar revision.   - scheduling worksheet filled out    4. Wheezing/cough  - recommended PCP visit to evaluate for asthma and other causes    Phone call for results. Patient staffed with Dr. King.     Rochelle Carrillo MD  Ob/Gyn, PGY-3  11/27/2019    The Patient was seen in [facility] by ROCHELLE CARRILLO.  I reviewed the history & exam. Assessment and plan were jointly made.    Celia King MD

## 2019-12-03 NOTE — TELEPHONE ENCOUNTER
Confirmed surgery date with patient 1/28/20 with arrival time at 7:00a.m with nothing to eat eight hours before scheduled surgery time and clear liquids up to two hours before scheduled surgery time, informed patient that a pre op physical is needed within thirty days of surgery and that a map and letter will be mailed out.     to complete the following fields:            CHECKLIST     Google Calendar : Yes     Resident notified:YES     Clinic schedule blocked:  Not Applicable    Patient notified:Yes      Pre op information sent: Yes     Given to patient over the phone.Yes    Comments:

## 2019-12-16 ENCOUNTER — TELEPHONE (OUTPATIENT)
Dept: OBGYN | Facility: CLINIC | Age: 34
End: 2019-12-16

## 2019-12-16 DIAGNOSIS — D50.9 ANEMIA, IRON DEFICIENCY: Primary | ICD-10-CM

## 2019-12-16 NOTE — TELEPHONE ENCOUNTER
Received call from pt spouse, Anam Sierra, stating he is concerned about Aischa test results and is wondering if something needs to be done.    Per Epic, no signed consent on file to speak with Anam about protected health information. This information was given to Anam and asked that he have Aischa call us and we can discuss the results with her. [place] and speaks Amharic so this would be difficult.  Offered to give Kinyarwanda  number for her to use when she call us back. He said 'thank you' and hung up.    Forwarding to Dr. King to review labs and advise if something more needs to be done as she is scheduled for surgery at the end of January.

## 2019-12-16 NOTE — TELEPHONE ENCOUNTER
Received message from patient's , Anam, wanting to discuss lab results. Returned call and voicemail left.

## 2019-12-18 NOTE — TELEPHONE ENCOUNTER
Tried to reach [facility] but received voicemail.  Left message to call back to discuss lab results and recommendations.    Hgb ordered.      Celia King MD sent to Katherine Clemons RN   Caller: Unspecified (2 days ago,  4:03 PM)             She could do IV iron when she returns. She needs a repeat Hgb electorphoresis eval- which is ordered and should have repeat Hgb at that time.

## 2020-01-07 NOTE — TELEPHONE ENCOUNTER
Tried to reach John but her phone was not accepting call.  Left message on appointment note for upcoming appointment for provider to review hgb labs and recommendation of IV iron.

## 2020-01-15 DIAGNOSIS — D50.9 ANEMIA, IRON DEFICIENCY: ICD-10-CM

## 2020-01-15 LAB — HGB BLD-MCNC: 9.1 G/DL (ref 11.7–15.7)

## 2020-01-15 PROCEDURE — 85018 HEMOGLOBIN: CPT | Performed by: OBSTETRICS & GYNECOLOGY

## 2020-01-15 PROCEDURE — 36415 COLL VENOUS BLD VENIPUNCTURE: CPT | Performed by: OBSTETRICS & GYNECOLOGY

## 2020-01-21 NOTE — TELEPHONE ENCOUNTER
Received message from patient's  asking when Aischa's visit is scheduled.     Returned call to Anam and voicemail left.

## 2020-01-22 ENCOUNTER — OFFICE VISIT (OUTPATIENT)
Dept: INTERNAL MEDICINE | Facility: CLINIC | Age: 35
End: 2020-01-22
Attending: INTERNAL MEDICINE
Payer: COMMERCIAL

## 2020-01-22 VITALS
HEART RATE: 73 BPM | HEIGHT: 67 IN | WEIGHT: 151 LBS | SYSTOLIC BLOOD PRESSURE: 102 MMHG | DIASTOLIC BLOOD PRESSURE: 63 MMHG | BODY MASS INDEX: 23.7 KG/M2

## 2020-01-22 DIAGNOSIS — J45.20 MILD INTERMITTENT ASTHMA WITHOUT COMPLICATION: Primary | ICD-10-CM

## 2020-01-22 RX ORDER — ALBUTEROL SULFATE 90 UG/1
2 AEROSOL, METERED RESPIRATORY (INHALATION) EVERY 6 HOURS
Qty: 1 INHALER | Refills: 0 | Status: SHIPPED | OUTPATIENT
Start: 2020-01-22 | End: 2020-06-02

## 2020-01-22 RX ORDER — FERROUS SULFATE 325(65) MG
325 TABLET ORAL
COMMUNITY
End: 2020-06-12

## 2020-01-22 ASSESSMENT — ANXIETY QUESTIONNAIRES
5. BEING SO RESTLESS THAT IT IS HARD TO SIT STILL: NOT AT ALL
6. BECOMING EASILY ANNOYED OR IRRITABLE: SEVERAL DAYS
2. NOT BEING ABLE TO STOP OR CONTROL WORRYING: NOT AT ALL
GAD7 TOTAL SCORE: 5
7. FEELING AFRAID AS IF SOMETHING AWFUL MIGHT HAPPEN: SEVERAL DAYS
3. WORRYING TOO MUCH ABOUT DIFFERENT THINGS: MORE THAN HALF THE DAYS
1. FEELING NERVOUS, ANXIOUS, OR ON EDGE: SEVERAL DAYS

## 2020-01-22 ASSESSMENT — MIFFLIN-ST. JEOR: SCORE: 1421.43

## 2020-01-22 ASSESSMENT — PATIENT HEALTH QUESTIONNAIRE - PHQ9
5. POOR APPETITE OR OVEREATING: NOT AT ALL
SUM OF ALL RESPONSES TO PHQ QUESTIONS 1-9: 10

## 2020-01-22 ASSESSMENT — PAIN SCALES - GENERAL: PAINLEVEL: NO PAIN (0)

## 2020-01-22 NOTE — PROGRESS NOTES
[facility]   [facility]  [address] [facility] [address]  [address]  [facility]  [facility] [id number] [phone]  Dept: [phone]    PRE-OP EVALUATION:  Today's date: 2020    John Guaman (: 1985) presents for pre-operative evaluation assessment as requested by Dr. Leung.  She requires evaluation and anesthesia risk assessment prior to undergoing surgery/procedure for treatment of uterine fibroids .    Proposed Surgery/ Procedure: myomectomy  Date of Surgery/ Procedure: 2020  Time of Surgery/ Procedure: 9:00 AM  Hospital/Surgical Facility: [facility]  Primary Physician: No Ref-Primary, Physician  Type of Anesthesia Anticipated: General    Patient has a Health Care Directive or Living Will:  NO    1. NO - Do you have a history of heart attack, stroke, stent, bypass or surgery on an artery in the head, neck, heart or legs?  2. NO - Do you ever have any pain or discomfort in your chest?  3. NO - Do you have a history of  Heart Failure?  4. NO - Are you troubled by shortness of breath when: walking on the level, up a slight hill or at night?  5. NO - Do you currently have a cold, bronchitis or other respiratory infection?  6. NO - Do you have a cough, shortness of breath or wheezing?  7. NO - Do you sometimes get pains in the calves of your legs when you walk?  8. NO - Do you or anyone in your family have previous history of blood clots?  9. NO - Do you or does anyone in your family have a serious bleeding problem such as prolonged bleeding following surgeries or cuts?  10. YES - HAVE YOU EVER HAD PROBLEMS WITH ANEMIA OR BEEN TOLD TO TAKE IRON PILLS? Iron deficiency anemia  11. NO - Have you had any abnormal blood loss such as black, tarry or bloody stools, or abnormal vaginal bleeding?  12. NO - Have you ever had a blood transfusion?  13. NO - Have you or any of your relatives ever had problems with anesthesia?  14. NO - Do you have sleep apnea,  excessive snoring or daytime drowsiness?  15. NO - Do you have any prosthetic heart valves?  16. NO - Do you have prosthetic joints?  17. NO - Is there any chance that you may be pregnant?      HPI:     HPI related to upcoming procedure: Patient reports menorrhagia and painful periods. She has been trying to conceive, however, has not been able to archive pregnancy.       ANEMIA - Patient has a recent history of moderate-severe anemia, which has been symptomatic. Work up to date has revealed iron deficiency. Treatment has been iron supplementation.     ASTHMA - Patient has a longstanding history of mild Asthma . Patient has been doing well overall noting NO SYMPTOMS and continues on medication regimen consisting of albuterol as needed without adverse reactions or side effects.       MEDICAL HISTORY:     Patient Active Problem List    Diagnosis Date Noted     Abnormal uterine bleeding 12/03/2019     Priority: Medium     Added automatically from request for surgery [id number]        Past Medical History:   Diagnosis Date     NO ACTIVE PROBLEMS      Past Surgical History:   Procedure Laterality Date     APPENDECTOMY       DILATION AND CURETTAGE       GYN SURGERY       Current Outpatient Medications   Medication Sig Dispense Refill     docusate (COLACE) 50 MG/5ML liquid Take 5 mLs (50 mg) by mouth 2 times daily as needed for constipation 473 mL 0     ferrous sulfate (FEROSUL) 325 (65 Fe) MG tablet Take 325 mg by mouth daily (with breakfast)       IRON PO        Prenatal Vit-Fe Fumarate-FA (PRENATAL MULTIVITAMIN W/IRON) 27-0.8 MG tablet Take 1 tablet by mouth daily 90 tablet 3     OTC products: None, except as noted above    Allergies   Allergen Reactions     Aspirin Hives      Latex Allergy: NO    Social History     Tobacco Use     Smoking status: Current Every Day Smoker     Types: Cigarettes     Smokeless tobacco: Never Used   Substance Use Topics     Alcohol use: Yes     Frequency: 4 or more times a week     Drinks per  "session: 3 or 4     Binge frequency: Less than monthly     History   Drug Use No       REVIEW OF SYSTEMS:   CONSTITUTIONAL: NEGATIVE for fever, chills, change in weight  INTEGUMENTARY/SKIN: NEGATIVE for worrisome rashes, moles or lesions  EYES: NEGATIVE for vision changes or irritation  ENT/MOUTH: NEGATIVE for ear, mouth and throat problems  RESP: NEGATIVE for significant cough or SOB  BREAST: NEGATIVE for masses, tenderness or discharge  CV: NEGATIVE for chest pain, palpitations or peripheral edema  GI: NEGATIVE for nausea, abdominal pain, heartburn, or change in bowel habits  : NEGATIVE for frequency, dysuria, or hematuria  MUSCULOSKELETAL: NEGATIVE for significant arthralgias or myalgia  NEURO: NEGATIVE for weakness, dizziness or paresthesias  ENDOCRINE: NEGATIVE for temperature intolerance, skin/hair changes  HEME: NEGATIVE for bleeding problems  PSYCHIATRIC: NEGATIVE for changes in mood or affect    EXAM:   /63   Pulse 73   Ht 1.7 m (5' 6.93\")   Wt 68.5 kg (151 lb)   LMP 01/02/2020   Breastfeeding No   BMI 23.70 kg/m      GENERAL APPEARANCE: healthy, alert and no distress     EYES: EOMI, PERRL     HENT: ear canals and TM's normal and nose and mouth without ulcers or lesions     NECK: no adenopathy, no asymmetry, masses, or scars and thyroid normal to palpation     RESP: lungs clear to auscultation - no rales, rhonchi or wheezes     CV: regular rates and rhythm, normal S1 S2, no S3 or S4 and no murmur, click or rub     ABDOMEN:  soft, nontender, no HSM or masses and bowel sounds normal     MS: extremities normal- no gross deformities noted, no evidence of inflammation in joints, FROM in all extremities.     SKIN: no suspicious lesions or rashes     NEURO: Normal strength and tone, sensory exam grossly normal, mentation intact and speech normal     PSYCH: mentation appears normal. and affect normal/bright     LYMPHATICS: No cervical adenopathy    DIAGNOSTICS:   EKG: Not indicated due to non-vascular " surgery and low risk of event (age <65 and without cardiac risk factors)    Recent Labs   Lab Test 01/15/20  1329 09/25/19  1400   HGB 9.1* 8.5*   PLT  --  276   NA  --  139   POTASSIUM  --  4.0   CR  --  0.86        IMPRESSION:   Reason for surgery/procedure: uterine fibroids  Diagnosis/reason for consult: asthma, anemia    The proposed surgical procedure is considered INTERMEDIATE risk.    REVISED CARDIAC RISK INDEX  The patient has the following serious cardiovascular risks for perioperative complications such as (MI, PE, VFib and 3  AV Block):  No serious cardiac risks  INTERPRETATION: 1 risks: Class II (low risk - 0.9% complication rate)    The patient has the following additional risks for perioperative complications:  No identified additional risks      ICD-10-CM    1. Mild intermittent asthma without complication J45.20 albuterol (PROAIR HFA/PROVENTIL HFA/VENTOLIN HFA) 108 (90 Base) MCG/ACT inhaler     General PFT Lab (Please always keep checked)     Pulmonary Function Test     MED THERAPY MANAGE REFERRAL       RECOMMENDATIONS:       Pulmonary Risk  Incentive spirometry post op  Respiratory Therapy (Respiratory Care IP Consult)  post op  NG tube decompression if abdominal distension or significant vomiting       --Patient is to take all scheduled medications on the day of surgery EXCEPT for modifications listed below.    APPROVAL GIVEN to proceed with proposed procedure, without further diagnostic evaluation       Signed Electronically by: Vani Saldaña MD    Copy of this evaluation report is provided to requesting physician.    [place] Preop Guidelines    Revised Cardiac Risk Index

## 2020-01-22 NOTE — LETTER
2020       RE: John Guaman  [address]  [place] [facility] [address]     Dear Colleague,    Thank you for referring your patient, John Guaman, to the [facility]  at [facility]. Please see a copy of my visit note below.    [facility]   [facility]  [address]  [address]  [facility]  [facility] [id number] [phone]  Dept: [phone]    PRE-OP EVALUATION:  Today's date: 2020    John Guaman (: 1985) presents for pre-operative evaluation assessment as requested by Dr. Leung.  She requires evaluation and anesthesia risk assessment prior to undergoing surgery/procedure for treatment of uterine fibroids .    Proposed Surgery/ Procedure: myomectomy  Date of Surgery/ Procedure: 2020  Time of Surgery/ Procedure: 9:00 AM  Hospital/Surgical Facility: [facility]  Primary Physician: No Ref-Primary, Physician  Type of Anesthesia Anticipated: General    Patient has a Health Care Directive or Living Will:  NO    1. NO - Do you have a history of heart attack, stroke, stent, bypass or surgery on an artery in the head, neck, heart or legs?  2. NO - Do you ever have any pain or discomfort in your chest?  3. NO - Do you have a history of  Heart Failure?  4. NO - Are you troubled by shortness of breath when: walking on the level, up a slight hill or at night?  5. NO - Do you currently have a cold, bronchitis or other respiratory infection?  6. NO - Do you have a cough, shortness of breath or wheezing?  7. NO - Do you sometimes get pains in the calves of your legs when you walk?  8. NO - Do you or anyone in your family have previous history of blood clots?  9. NO - Do you or does anyone in your family have a serious bleeding problem such as prolonged bleeding following surgeries or cuts?  10. YES - HAVE YOU EVER HAD PROBLEMS WITH ANEMIA OR BEEN TOLD TO TAKE IRON PILLS? Iron deficiency  anemia  11. NO - Have you had any abnormal blood loss such as black, tarry or bloody stools, or abnormal vaginal bleeding?  12. NO - Have you ever had a blood transfusion?  13. NO - Have you or any of your relatives ever had problems with anesthesia?  14. NO - Do you have sleep apnea, excessive snoring or daytime drowsiness?  15. NO - Do you have any prosthetic heart valves?  16. NO - Do you have prosthetic joints?  17. NO - Is there any chance that you may be pregnant?      HPI:     HPI related to upcoming procedure: Patient reports menorrhagia and painful periods. She has been trying to conceive, however, has not been able to archive pregnancy.       ANEMIA - Patient has a recent history of moderate-severe anemia, which has been symptomatic. Work up to date has revealed iron deficiency. Treatment has been iron supplementation.     ASTHMA - Patient has a longstanding history of mild Asthma . Patient has been doing well overall noting NO SYMPTOMS and continues on medication regimen consisting of albuterol as needed without adverse reactions or side effects.       MEDICAL HISTORY:     Patient Active Problem List    Diagnosis Date Noted     Abnormal uterine bleeding 12/03/2019     Priority: Medium     Added automatically from request for surgery [id number]        Past Medical History:   Diagnosis Date     NO ACTIVE PROBLEMS      Past Surgical History:   Procedure Laterality Date     APPENDECTOMY       DILATION AND CURETTAGE       GYN SURGERY       Current Outpatient Medications   Medication Sig Dispense Refill     docusate (COLACE) 50 MG/5ML liquid Take 5 mLs (50 mg) by mouth 2 times daily as needed for constipation 473 mL 0     ferrous sulfate (FEROSUL) 325 (65 Fe) MG tablet Take 325 mg by mouth daily (with breakfast)       IRON PO        Prenatal Vit-Fe Fumarate-FA (PRENATAL MULTIVITAMIN W/IRON) 27-0.8 MG tablet Take 1 tablet by mouth daily 90 tablet 3     OTC products: None, except as noted above    Allergies  "  Allergen Reactions     Aspirin Hives      Latex Allergy: NO    Social History     Tobacco Use     Smoking status: Current Every Day Smoker     Types: Cigarettes     Smokeless tobacco: Never Used   Substance Use Topics     Alcohol use: Yes     Frequency: 4 or more times a week     Drinks per session: 3 or 4     Binge frequency: Less than monthly     History   Drug Use No       REVIEW OF SYSTEMS:   CONSTITUTIONAL: NEGATIVE for fever, chills, change in weight  INTEGUMENTARY/SKIN: NEGATIVE for worrisome rashes, moles or lesions  EYES: NEGATIVE for vision changes or irritation  ENT/MOUTH: NEGATIVE for ear, mouth and throat problems  RESP: NEGATIVE for significant cough or SOB  BREAST: NEGATIVE for masses, tenderness or discharge  CV: NEGATIVE for chest pain, palpitations or peripheral edema  GI: NEGATIVE for nausea, abdominal pain, heartburn, or change in bowel habits  : NEGATIVE for frequency, dysuria, or hematuria  MUSCULOSKELETAL: NEGATIVE for significant arthralgias or myalgia  NEURO: NEGATIVE for weakness, dizziness or paresthesias  ENDOCRINE: NEGATIVE for temperature intolerance, skin/hair changes  HEME: NEGATIVE for bleeding problems  PSYCHIATRIC: NEGATIVE for changes in mood or affect    EXAM:   /63   Pulse 73   Ht 1.7 m (5' 6.93\")   Wt 68.5 kg (151 lb)   LMP 01/02/2020   Breastfeeding No   BMI 23.70 kg/m       GENERAL APPEARANCE: healthy, alert and no distress     EYES: EOMI, PERRL     HENT: ear canals and TM's normal and nose and mouth without ulcers or lesions     NECK: no adenopathy, no asymmetry, masses, or scars and thyroid normal to palpation     RESP: lungs clear to auscultation - no rales, rhonchi or wheezes     CV: regular rates and rhythm, normal S1 S2, no S3 or S4 and no murmur, click or rub     ABDOMEN:  soft, nontender, no HSM or masses and bowel sounds normal     MS: extremities normal- no gross deformities noted, no evidence of inflammation in joints, FROM in all extremities.     " SKIN: no suspicious lesions or rashes     NEURO: Normal strength and tone, sensory exam grossly normal, mentation intact and speech normal     PSYCH: mentation appears normal. and affect normal/bright     LYMPHATICS: No cervical adenopathy    DIAGNOSTICS:   EKG: Not indicated due to non-vascular surgery and low risk of event (age <65 and without cardiac risk factors)    Recent Labs   Lab Test 01/15/20  1329 09/25/19  1400   HGB 9.1* 8.5*   PLT  --  276   NA  --  139   POTASSIUM  --  4.0   CR  --  0.86        IMPRESSION:   Reason for surgery/procedure: uterine fibroids  Diagnosis/reason for consult: asthma, anemia    The proposed surgical procedure is considered INTERMEDIATE risk.    REVISED CARDIAC RISK INDEX  The patient has the following serious cardiovascular risks for perioperative complications such as (MI, PE, VFib and 3  AV Block):  No serious cardiac risks  INTERPRETATION: 1 risks: Class II (low risk - 0.9% complication rate)    The patient has the following additional risks for perioperative complications:  No identified additional risks      ICD-10-CM    1. Mild intermittent asthma without complication J45.20 albuterol (PROAIR HFA/PROVENTIL HFA/VENTOLIN HFA) 108 (90 Base) MCG/ACT inhaler     General PFT Lab (Please always keep checked)     Pulmonary Function Test     MED THERAPY MANAGE REFERRAL       RECOMMENDATIONS:     Pulmonary Risk  Incentive spirometry post op  Respiratory Therapy (Respiratory Care IP Consult)  post op  NG tube decompression if abdominal distension or significant vomiting     --Patient is to take all scheduled medications on the day of surgery EXCEPT for modifications listed below.    APPROVAL GIVEN to proceed with proposed procedure, without further diagnostic evaluation       Signed Electronically by: Vani Saldaña MD    Copy of this evaluation report is provided to requesting physician.    [place] Preop Guidelines    Revised Cardiac Risk Index

## 2020-01-23 ASSESSMENT — ANXIETY QUESTIONNAIRES: GAD7 TOTAL SCORE: 5

## 2020-01-27 NOTE — TELEPHONE ENCOUNTER
Called John and she answered the phone and then spouse got on the line as well.  Informed her of low hgb and recommendation from Dr. Carrillo for IV iron.  Phone number given for Infusion Center.  John and spouse indicated understanding and agreed with plan.    Therapy plan ordered.

## 2020-01-30 PROBLEM — D50.0 IRON DEFICIENCY ANEMIA DUE TO CHRONIC BLOOD LOSS: Status: ACTIVE | Noted: 2020-01-30

## 2020-02-03 ENCOUNTER — INFUSION THERAPY VISIT (OUTPATIENT)
Dept: INFUSION THERAPY | Facility: CLINIC | Age: 35
End: 2020-02-03
Attending: OBSTETRICS & GYNECOLOGY
Payer: COMMERCIAL

## 2020-02-03 VITALS
TEMPERATURE: 98.2 F | DIASTOLIC BLOOD PRESSURE: 64 MMHG | SYSTOLIC BLOOD PRESSURE: 101 MMHG | RESPIRATION RATE: 18 BRPM | HEART RATE: 66 BPM | OXYGEN SATURATION: 100 %

## 2020-02-03 DIAGNOSIS — D50.0 IRON DEFICIENCY ANEMIA DUE TO CHRONIC BLOOD LOSS: Primary | ICD-10-CM

## 2020-02-03 PROCEDURE — 96365 THER/PROPH/DIAG IV INF INIT: CPT

## 2020-02-03 PROCEDURE — 25000128 H RX IP 250 OP 636: Performed by: OBSTETRICS & GYNECOLOGY

## 2020-02-03 PROCEDURE — 25800030 ZZH RX IP 258 OP 636: Performed by: OBSTETRICS & GYNECOLOGY

## 2020-02-03 RX ADMIN — IRON SUCROSE 300 MG: 20 INJECTION, SOLUTION INTRAVENOUS at 13:58

## 2020-02-03 NOTE — PROGRESS NOTES
Infusion Nursing Note:  John Guaman presents today for iron sucrose 300mg #1.    Patient seen by provider today: No   present during visit today: Not Applicable.    Note: Arrived with .    Intravenous Access:  Peripheral IV placed.    Treatment Conditions:  Not Applicable.      Post Infusion Assessment:  Patient tolerated infusion without incident.  Patient observed for 30 minutes post iron infusion, per protocol.  Blood return noted pre and post infusion.  Site patent and intact, free from redness, edema or discomfort.  No evidence of extravasations.  Access discontinued per protocol.       Discharge Plan:   Discharge instructions reviewed with: Patient.  Patient discharged in stable condition accompanied by: self.  Departure Mode: Ambulatory.  Iron sucrose printed info was reviewed and given to patient.    Mariola Garay

## 2020-02-03 NOTE — PATIENT INSTRUCTIONS
Patient Education     Patient Education    Iron Sucrose Chewable tablet    Iron Sucrose Solution for injection  Iron Sucrose Solution for injection  What is this medicine?  IRON SUCROSE (NANCY ennis) is an iron complex. Iron is used to make healthy red blood cells, which carry oxygen and nutrients throughout the body. This medicine is used to treat iron deficiency anemia in people with chronic kidney disease.  This medicine may be used for other purposes; ask your health care provider or pharmacist if you have questions.  What should I tell my health care provider before I take this medicine?  They need to know if you have any of these conditions:    anemia not caused by low iron levels    heart disease    high levels of iron in the blood    kidney disease    liver disease    an unusual or allergic reaction to iron, other medicines, foods, dyes, or preservatives    pregnant or trying to get pregnant    breast-feeding  How should I use this medicine?  This medicine is for infusion into a vein. It is given by a health care professional in a hospital or clinic setting.  Talk to your pediatrician regarding the use of this medicine in children. While this drug may be prescribed for children as young as 2 years for selected conditions, precautions do apply.  Overdosage: If you think you have taken too much of this medicine contact a poison control center or emergency room at once.  NOTE: This medicine is only for you. Do not share this medicine with others.  What if I miss a dose?  It is important not to miss your dose. Call your doctor or health care professional if you are unable to keep an appointment.  What may interact with this medicine?  Do not take this medicine with any of the following medications:    deferoxamine    dimercaprol    other iron products  This medicine may also interact with the following medications:    chloramphenicol    deferasirox  This list may not describe all possible interactions.  Give your health care provider a list of all the medicines, herbs, non-prescription drugs, or dietary supplements you use. Also tell them if you smoke, drink alcohol, or use illegal drugs. Some items may interact with your medicine.  What should I watch for while using this medicine?  Visit your doctor or healthcare professional regularly. Tell your doctor or healthcare professional if your symptoms do not start to get better or if they get worse. You may need blood work done while you are taking this medicine.  You may need to follow a special diet. Talk to your doctor. Foods that contain iron include: whole grains/cereals, dried fruits, beans, or peas, leafy green vegetables, and organ meats (liver, kidney).  What side effects may I notice from receiving this medicine?  Side effects that you should report to your doctor or health care professional as soon as possible:    allergic reactions like skin rash, itching or hives, swelling of the face, lips, or tongue    breathing problems    changes in blood pressure    cough    fast, irregular heartbeat    feeling faint or lightheaded, falls    fever or chills    flushing, sweating, or hot feelings    joint or muscle aches/pains    seizures    swelling of the ankles or feet    unusually weak or tired  Side effects that usually do not require medical attention (report to your doctor or health care professional if they continue or are bothersome):    diarrhea    feeling achy    headache    irritation at site where injected    nausea, vomiting    stomach upset    tiredness  This list may not describe all possible side effects. Call your doctor for medical advice about side effects. You may report side effects to FDA at 4-448-FDA-7250.  Where should I keep my medicine?  This drug is given in a hospital or clinic and will not be stored at home.  NOTE:This sheet is a summary. It may not cover all possible information. If you have questions about this medicine, talk to your  doctor, pharmacist, or health care provider. Copyright  2016 Gold Standard

## 2020-02-04 NOTE — TELEPHONE ENCOUNTER
Patient has had one infusion and is scheduled for the other two with the last one scheduled for 2/11/2020.  Spoke with Dr. King - since surgery is scheduled for 2/18/2020, have pre-op labs done day before procedure on 2/17/2020 so hgb is known prior to surgery.    Tried to reach [facility] but received voicemail.  Left message that Dr. King is recommending that you have lab work done the day before your procedure, on Monday Feb 17. Please call clinic with questions at [phone].

## 2020-02-06 ENCOUNTER — INFUSION THERAPY VISIT (OUTPATIENT)
Dept: INFUSION THERAPY | Facility: CLINIC | Age: 35
End: 2020-02-06
Attending: OBSTETRICS & GYNECOLOGY
Payer: COMMERCIAL

## 2020-02-06 VITALS
TEMPERATURE: 98.4 F | DIASTOLIC BLOOD PRESSURE: 57 MMHG | SYSTOLIC BLOOD PRESSURE: 110 MMHG | HEART RATE: 53 BPM | RESPIRATION RATE: 18 BRPM

## 2020-02-06 DIAGNOSIS — D50.0 IRON DEFICIENCY ANEMIA DUE TO CHRONIC BLOOD LOSS: Primary | ICD-10-CM

## 2020-02-06 PROCEDURE — 25800030 ZZH RX IP 258 OP 636: Performed by: OBSTETRICS & GYNECOLOGY

## 2020-02-06 PROCEDURE — 96366 THER/PROPH/DIAG IV INF ADDON: CPT

## 2020-02-06 PROCEDURE — 96365 THER/PROPH/DIAG IV INF INIT: CPT

## 2020-02-06 PROCEDURE — 25000128 H RX IP 250 OP 636: Performed by: OBSTETRICS & GYNECOLOGY

## 2020-02-06 RX ADMIN — IRON SUCROSE 300 MG: 20 INJECTION, SOLUTION INTRAVENOUS at 13:09

## 2020-02-06 NOTE — PROGRESS NOTES
Here for iron infusion. Energy level is satisfactory. Tolerated infusion.  No delayed reaction noted. Left ambulatory when discharged. To return as needed.

## 2020-02-07 NOTE — TELEPHONE ENCOUNTER
Spoke with John and confirmed she received message to have labs done on 2/17/20, the day before her surgery.  She indicated understanding and agreed with plan. She had no questions.

## 2020-02-11 ENCOUNTER — INFUSION THERAPY VISIT (OUTPATIENT)
Dept: INFUSION THERAPY | Facility: CLINIC | Age: 35
End: 2020-02-11
Attending: OBSTETRICS & GYNECOLOGY
Payer: COMMERCIAL

## 2020-02-11 VITALS
SYSTOLIC BLOOD PRESSURE: 102 MMHG | DIASTOLIC BLOOD PRESSURE: 56 MMHG | HEART RATE: 54 BPM | OXYGEN SATURATION: 100 % | TEMPERATURE: 97.8 F | RESPIRATION RATE: 16 BRPM

## 2020-02-11 DIAGNOSIS — D50.0 IRON DEFICIENCY ANEMIA DUE TO CHRONIC BLOOD LOSS: Primary | ICD-10-CM

## 2020-02-11 PROCEDURE — 96365 THER/PROPH/DIAG IV INF INIT: CPT

## 2020-02-11 PROCEDURE — 25000128 H RX IP 250 OP 636: Performed by: OBSTETRICS & GYNECOLOGY

## 2020-02-11 PROCEDURE — 96366 THER/PROPH/DIAG IV INF ADDON: CPT

## 2020-02-11 PROCEDURE — 25800030 ZZH RX IP 258 OP 636: Performed by: OBSTETRICS & GYNECOLOGY

## 2020-02-11 RX ADMIN — IRON SUCROSE 300 MG: 20 INJECTION, SOLUTION INTRAVENOUS at 13:34

## 2020-02-11 NOTE — PROGRESS NOTES
Infusion Nursing Note:  John Guaman presents today for Iron infusion.    Patient seen by provider today: No   present during visit today: Not Applicable.    Note: N/A.    Intravenous Access:  Peripheral IV placed.    Treatment Conditions:  Not Applicable.      Post Infusion Assessment:  Patient tolerated infusion without incident.  Site patent and intact, free from redness, edema or discomfort.  No evidence of extravasations.  Access discontinued per protocol.       Discharge Plan:   Patient and/or family verbalized understanding of discharge instructions and all questions answered.    ADA SALINAS RN

## 2020-02-17 ENCOUNTER — ANESTHESIA EVENT (OUTPATIENT)
Dept: SURGERY | Facility: CLINIC | Age: 35
End: 2020-02-17
Payer: COMMERCIAL

## 2020-02-17 DIAGNOSIS — D50.8 OTHER IRON DEFICIENCY ANEMIA: ICD-10-CM

## 2020-02-17 LAB
ABO + RH BLD: NORMAL
ABO + RH BLD: NORMAL
B-HCG SERPL-ACNC: <1 IU/L (ref 0–5)
BLD GP AB SCN SERPL QL: NORMAL
BLOOD BANK CMNT PATIENT-IMP: NORMAL
ERYTHROCYTE [DISTWIDTH] IN BLOOD BY AUTOMATED COUNT: 27.3 % (ref 10–15)
HCT VFR BLD AUTO: 37.2 % (ref 35–47)
HGB BLD-MCNC: 11.1 G/DL (ref 11.7–15.7)
MCH RBC QN AUTO: 22 PG (ref 26.5–33)
MCHC RBC AUTO-ENTMCNC: 29.8 G/DL (ref 31.5–36.5)
MCV RBC AUTO: 74 FL (ref 78–100)
PLATELET # BLD AUTO: 194 10E9/L (ref 150–450)
RBC # BLD AUTO: 5.05 10E12/L (ref 3.8–5.2)
SPECIMEN EXP DATE BLD: NORMAL
WBC # BLD AUTO: 4.1 10E9/L (ref 4–11)

## 2020-02-17 PROCEDURE — 83020 HEMOGLOBIN ELECTROPHORESIS: CPT | Performed by: OBSTETRICS & GYNECOLOGY

## 2020-02-17 PROCEDURE — 81259 HBA1/HBA2 FULL GENE SEQUENCE: CPT | Performed by: OBSTETRICS & GYNECOLOGY

## 2020-02-17 PROCEDURE — 86900 BLOOD TYPING SEROLOGIC ABO: CPT | Performed by: STUDENT IN AN ORGANIZED HEALTH CARE EDUCATION/TRAINING PROGRAM

## 2020-02-17 PROCEDURE — 86901 BLOOD TYPING SEROLOGIC RH(D): CPT | Performed by: STUDENT IN AN ORGANIZED HEALTH CARE EDUCATION/TRAINING PROGRAM

## 2020-02-17 PROCEDURE — 84702 CHORIONIC GONADOTROPIN TEST: CPT | Performed by: OBSTETRICS & GYNECOLOGY

## 2020-02-17 PROCEDURE — 86850 RBC ANTIBODY SCREEN: CPT | Performed by: STUDENT IN AN ORGANIZED HEALTH CARE EDUCATION/TRAINING PROGRAM

## 2020-02-17 PROCEDURE — 81269 HBA1/HBA2 GENE DUP/DEL VRNTS: CPT | Performed by: OBSTETRICS & GYNECOLOGY

## 2020-02-17 PROCEDURE — 85027 COMPLETE CBC AUTOMATED: CPT | Performed by: OBSTETRICS & GYNECOLOGY

## 2020-02-17 PROCEDURE — 83021 HEMOGLOBIN CHROMOTOGRAPHY: CPT | Performed by: OBSTETRICS & GYNECOLOGY

## 2020-02-17 PROCEDURE — 36415 COLL VENOUS BLD VENIPUNCTURE: CPT | Performed by: OBSTETRICS & GYNECOLOGY

## 2020-02-18 ENCOUNTER — HOSPITAL ENCOUNTER (INPATIENT)
Facility: CLINIC | Age: 35
LOS: 2 days | Discharge: HOME | End: 2020-02-20
Attending: OBSTETRICS & GYNECOLOGY | Admitting: OBSTETRICS & GYNECOLOGY
Payer: COMMERCIAL

## 2020-02-18 ENCOUNTER — ANESTHESIA (OUTPATIENT)
Dept: SURGERY | Facility: CLINIC | Age: 35
End: 2020-02-18
Payer: COMMERCIAL

## 2020-02-18 DIAGNOSIS — N93.9 ABNORMAL UTERINE BLEEDING: ICD-10-CM

## 2020-02-18 DIAGNOSIS — Z98.890 S/P MYOMECTOMY: Primary | ICD-10-CM

## 2020-02-18 LAB
B-HCG SERPL-ACNC: <1 IU/L (ref 0–5)
ERYTHROCYTE [DISTWIDTH] IN BLOOD BY AUTOMATED COUNT: 27 % (ref 10–15)
HCT VFR BLD AUTO: 37.9 % (ref 35–47)
HGB BLD-MCNC: 11.6 G/DL (ref 11.7–15.7)
MCH RBC QN AUTO: 22.5 PG (ref 26.5–33)
MCHC RBC AUTO-ENTMCNC: 30.6 G/DL (ref 31.5–36.5)
MCV RBC AUTO: 74 FL (ref 78–100)
PLATELET # BLD AUTO: 194 10E9/L (ref 150–450)
RBC # BLD AUTO: 5.15 10E12/L (ref 3.8–5.2)
WBC # BLD AUTO: 3.8 10E9/L (ref 4–11)

## 2020-02-18 PROCEDURE — 84702 CHORIONIC GONADOTROPIN TEST: CPT | Performed by: STUDENT IN AN ORGANIZED HEALTH CARE EDUCATION/TRAINING PROGRAM

## 2020-02-18 PROCEDURE — 25000125 ZZHC RX 250: Performed by: OBSTETRICS & GYNECOLOGY

## 2020-02-18 PROCEDURE — 25000128 H RX IP 250 OP 636: Performed by: NURSE ANESTHETIST, CERTIFIED REGISTERED

## 2020-02-18 PROCEDURE — 71000014 ZZH RECOVERY PHASE 1 LEVEL 2 FIRST HR: Performed by: OBSTETRICS & GYNECOLOGY

## 2020-02-18 PROCEDURE — 25000125 ZZHC RX 250: Performed by: NURSE ANESTHETIST, CERTIFIED REGISTERED

## 2020-02-18 PROCEDURE — 88305 TISSUE EXAM BY PATHOLOGIST: CPT | Performed by: OBSTETRICS & GYNECOLOGY

## 2020-02-18 PROCEDURE — 25800030 ZZH RX IP 258 OP 636: Performed by: OBSTETRICS & GYNECOLOGY

## 2020-02-18 PROCEDURE — 37000009 ZZH ANESTHESIA TECHNICAL FEE, EACH ADDTL 15 MIN: Performed by: OBSTETRICS & GYNECOLOGY

## 2020-02-18 PROCEDURE — C9290 INJ, BUPIVACAINE LIPOSOME: HCPCS | Performed by: ANESTHESIOLOGY

## 2020-02-18 PROCEDURE — 25000128 H RX IP 250 OP 636: Performed by: ANESTHESIOLOGY

## 2020-02-18 PROCEDURE — 85027 COMPLETE CBC AUTOMATED: CPT | Performed by: STUDENT IN AN ORGANIZED HEALTH CARE EDUCATION/TRAINING PROGRAM

## 2020-02-18 PROCEDURE — 25000125 ZZHC RX 250: Performed by: ANESTHESIOLOGY

## 2020-02-18 PROCEDURE — 0UB90ZZ EXCISION OF UTERUS, OPEN APPROACH: ICD-10-PCS | Performed by: OBSTETRICS & GYNECOLOGY

## 2020-02-18 PROCEDURE — 25800030 ZZH RX IP 258 OP 636: Performed by: STUDENT IN AN ORGANIZED HEALTH CARE EDUCATION/TRAINING PROGRAM

## 2020-02-18 PROCEDURE — 25000566 ZZH SEVOFLURANE, EA 15 MIN: Performed by: OBSTETRICS & GYNECOLOGY

## 2020-02-18 PROCEDURE — 25000128 H RX IP 250 OP 636: Performed by: STUDENT IN AN ORGANIZED HEALTH CARE EDUCATION/TRAINING PROGRAM

## 2020-02-18 PROCEDURE — 27210794 ZZH OR GENERAL SUPPLY STERILE: Performed by: OBSTETRICS & GYNECOLOGY

## 2020-02-18 PROCEDURE — 25000128 H RX IP 250 OP 636: Performed by: OBSTETRICS & GYNECOLOGY

## 2020-02-18 PROCEDURE — 36415 COLL VENOUS BLD VENIPUNCTURE: CPT | Performed by: STUDENT IN AN ORGANIZED HEALTH CARE EDUCATION/TRAINING PROGRAM

## 2020-02-18 PROCEDURE — 37000008 ZZH ANESTHESIA TECHNICAL FEE, 1ST 30 MIN: Performed by: OBSTETRICS & GYNECOLOGY

## 2020-02-18 PROCEDURE — 40000170 ZZH STATISTIC PRE-PROCEDURE ASSESSMENT II: Performed by: OBSTETRICS & GYNECOLOGY

## 2020-02-18 PROCEDURE — 88305 TISSUE EXAM BY PATHOLOGIST: CPT | Mod: 26 | Performed by: OBSTETRICS & GYNECOLOGY

## 2020-02-18 PROCEDURE — 25000132 ZZH RX MED GY IP 250 OP 250 PS 637: Performed by: STUDENT IN AN ORGANIZED HEALTH CARE EDUCATION/TRAINING PROGRAM

## 2020-02-18 PROCEDURE — 12000001 ZZH R&B MED SURG/OB UMMC

## 2020-02-18 PROCEDURE — 71000015 ZZH RECOVERY PHASE 1 LEVEL 2 EA ADDTL HR: Performed by: OBSTETRICS & GYNECOLOGY

## 2020-02-18 PROCEDURE — 36000059 ZZH SURGERY LEVEL 3 EA 15 ADDTL MIN UMMC: Performed by: OBSTETRICS & GYNECOLOGY

## 2020-02-18 PROCEDURE — C1765 ADHESION BARRIER: HCPCS | Performed by: OBSTETRICS & GYNECOLOGY

## 2020-02-18 PROCEDURE — 36000057 ZZH SURGERY LEVEL 3 1ST 30 MIN - UMMC: Performed by: OBSTETRICS & GYNECOLOGY

## 2020-02-18 PROCEDURE — 25800030 ZZH RX IP 258 OP 636: Performed by: ANESTHESIOLOGY

## 2020-02-18 RX ORDER — PROPOFOL 10 MG/ML
INJECTION, EMULSION INTRAVENOUS PRN
Status: DISCONTINUED | OUTPATIENT
Start: 2020-02-18 | End: 2020-02-18

## 2020-02-18 RX ORDER — ONDANSETRON 2 MG/ML
4 INJECTION INTRAMUSCULAR; INTRAVENOUS EVERY 30 MIN PRN
Status: DISCONTINUED | OUTPATIENT
Start: 2020-02-18 | End: 2020-02-18 | Stop reason: HOSPADM

## 2020-02-18 RX ORDER — ONDANSETRON 2 MG/ML
4 INJECTION INTRAMUSCULAR; INTRAVENOUS EVERY 6 HOURS PRN
Status: DISCONTINUED | OUTPATIENT
Start: 2020-02-18 | End: 2020-02-20 | Stop reason: HOSPADM

## 2020-02-18 RX ORDER — AMOXICILLIN 250 MG
1 CAPSULE ORAL 2 TIMES DAILY
Status: DISCONTINUED | OUTPATIENT
Start: 2020-02-18 | End: 2020-02-20 | Stop reason: HOSPADM

## 2020-02-18 RX ORDER — SODIUM CHLORIDE, SODIUM LACTATE, POTASSIUM CHLORIDE, CALCIUM CHLORIDE 600; 310; 30; 20 MG/100ML; MG/100ML; MG/100ML; MG/100ML
INJECTION, SOLUTION INTRAVENOUS CONTINUOUS
Status: DISCONTINUED | OUTPATIENT
Start: 2020-02-18 | End: 2020-02-18 | Stop reason: HOSPADM

## 2020-02-18 RX ORDER — MEPERIDINE HYDROCHLORIDE 25 MG/ML
12.5 INJECTION INTRAMUSCULAR; INTRAVENOUS; SUBCUTANEOUS
Status: DISCONTINUED | OUTPATIENT
Start: 2020-02-18 | End: 2020-02-18 | Stop reason: HOSPADM

## 2020-02-18 RX ORDER — NALOXONE HYDROCHLORIDE 0.4 MG/ML
.1-.4 INJECTION, SOLUTION INTRAMUSCULAR; INTRAVENOUS; SUBCUTANEOUS
Status: DISCONTINUED | OUTPATIENT
Start: 2020-02-18 | End: 2020-02-18 | Stop reason: HOSPADM

## 2020-02-18 RX ORDER — BUPIVACAINE HYDROCHLORIDE AND EPINEPHRINE 2.5; 5 MG/ML; UG/ML
INJECTION, SOLUTION INFILTRATION; PERINEURAL PRN
Status: DISCONTINUED | OUTPATIENT
Start: 2020-02-18 | End: 2020-02-18

## 2020-02-18 RX ORDER — LIDOCAINE 40 MG/G
CREAM TOPICAL
Status: DISCONTINUED | OUTPATIENT
Start: 2020-02-18 | End: 2020-02-20 | Stop reason: HOSPADM

## 2020-02-18 RX ORDER — HYDROMORPHONE HYDROCHLORIDE 1 MG/ML
.3-.5 INJECTION, SOLUTION INTRAMUSCULAR; INTRAVENOUS; SUBCUTANEOUS
Status: DISCONTINUED | OUTPATIENT
Start: 2020-02-18 | End: 2020-02-20 | Stop reason: HOSPADM

## 2020-02-18 RX ORDER — FENTANYL CITRATE 50 UG/ML
INJECTION, SOLUTION INTRAMUSCULAR; INTRAVENOUS PRN
Status: DISCONTINUED | OUTPATIENT
Start: 2020-02-18 | End: 2020-02-18

## 2020-02-18 RX ORDER — CEFAZOLIN SODIUM 1 G/3ML
1 INJECTION, POWDER, FOR SOLUTION INTRAMUSCULAR; INTRAVENOUS SEE ADMIN INSTRUCTIONS
Status: DISCONTINUED | OUTPATIENT
Start: 2020-02-18 | End: 2020-02-18 | Stop reason: HOSPADM

## 2020-02-18 RX ORDER — FENTANYL CITRATE 50 UG/ML
25-50 INJECTION, SOLUTION INTRAMUSCULAR; INTRAVENOUS
Status: DISCONTINUED | OUTPATIENT
Start: 2020-02-18 | End: 2020-02-18 | Stop reason: HOSPADM

## 2020-02-18 RX ORDER — FLUMAZENIL 0.1 MG/ML
0.2 INJECTION, SOLUTION INTRAVENOUS
Status: DISCONTINUED | OUTPATIENT
Start: 2020-02-18 | End: 2020-02-18 | Stop reason: HOSPADM

## 2020-02-18 RX ORDER — ALBUTEROL SULFATE 90 UG/1
2 AEROSOL, METERED RESPIRATORY (INHALATION) EVERY 6 HOURS
Status: DISCONTINUED | OUTPATIENT
Start: 2020-02-18 | End: 2020-02-20 | Stop reason: HOSPADM

## 2020-02-18 RX ORDER — OXYCODONE HYDROCHLORIDE 5 MG/1
5-10 TABLET ORAL
Status: DISCONTINUED | OUTPATIENT
Start: 2020-02-18 | End: 2020-02-20 | Stop reason: HOSPADM

## 2020-02-18 RX ORDER — KETOROLAC TROMETHAMINE 30 MG/ML
INJECTION, SOLUTION INTRAMUSCULAR; INTRAVENOUS PRN
Status: DISCONTINUED | OUTPATIENT
Start: 2020-02-18 | End: 2020-02-18

## 2020-02-18 RX ORDER — METOCLOPRAMIDE 5 MG/1
10 TABLET ORAL EVERY 6 HOURS PRN
Status: DISCONTINUED | OUTPATIENT
Start: 2020-02-18 | End: 2020-02-20 | Stop reason: HOSPADM

## 2020-02-18 RX ORDER — LIDOCAINE HYDROCHLORIDE 20 MG/ML
INJECTION, SOLUTION INFILTRATION; PERINEURAL PRN
Status: DISCONTINUED | OUTPATIENT
Start: 2020-02-18 | End: 2020-02-18

## 2020-02-18 RX ORDER — ONDANSETRON 2 MG/ML
INJECTION INTRAMUSCULAR; INTRAVENOUS PRN
Status: DISCONTINUED | OUTPATIENT
Start: 2020-02-18 | End: 2020-02-18

## 2020-02-18 RX ORDER — SODIUM CHLORIDE, SODIUM LACTATE, POTASSIUM CHLORIDE, CALCIUM CHLORIDE 600; 310; 30; 20 MG/100ML; MG/100ML; MG/100ML; MG/100ML
INJECTION, SOLUTION INTRAVENOUS
Status: DISPENSED
Start: 2020-02-18 | End: 2020-02-19

## 2020-02-18 RX ORDER — DIPHENHYDRAMINE HCL 25 MG
25 CAPSULE ORAL EVERY 6 HOURS PRN
Status: DISCONTINUED | OUTPATIENT
Start: 2020-02-18 | End: 2020-02-20 | Stop reason: HOSPADM

## 2020-02-18 RX ORDER — ACETAMINOPHEN 325 MG/1
650 TABLET ORAL EVERY 4 HOURS PRN
Status: DISCONTINUED | OUTPATIENT
Start: 2020-02-21 | End: 2020-02-20 | Stop reason: HOSPADM

## 2020-02-18 RX ORDER — NALOXONE HYDROCHLORIDE 0.4 MG/ML
.1-.4 INJECTION, SOLUTION INTRAMUSCULAR; INTRAVENOUS; SUBCUTANEOUS
Status: DISCONTINUED | OUTPATIENT
Start: 2020-02-18 | End: 2020-02-20 | Stop reason: HOSPADM

## 2020-02-18 RX ORDER — ONDANSETRON 4 MG/1
4 TABLET, ORALLY DISINTEGRATING ORAL EVERY 6 HOURS PRN
Status: DISCONTINUED | OUTPATIENT
Start: 2020-02-18 | End: 2020-02-20 | Stop reason: HOSPADM

## 2020-02-18 RX ORDER — IBUPROFEN 600 MG/1
600 TABLET, FILM COATED ORAL EVERY 6 HOURS
Status: DISCONTINUED | OUTPATIENT
Start: 2020-02-19 | End: 2020-02-20 | Stop reason: HOSPADM

## 2020-02-18 RX ORDER — ONDANSETRON 4 MG/1
4 TABLET, ORALLY DISINTEGRATING ORAL EVERY 30 MIN PRN
Status: DISCONTINUED | OUTPATIENT
Start: 2020-02-18 | End: 2020-02-18 | Stop reason: HOSPADM

## 2020-02-18 RX ORDER — DEXAMETHASONE SODIUM PHOSPHATE 4 MG/ML
INJECTION, SOLUTION INTRA-ARTICULAR; INTRALESIONAL; INTRAMUSCULAR; INTRAVENOUS; SOFT TISSUE PRN
Status: DISCONTINUED | OUTPATIENT
Start: 2020-02-18 | End: 2020-02-18

## 2020-02-18 RX ORDER — DIPHENHYDRAMINE HYDROCHLORIDE 50 MG/ML
25 INJECTION INTRAMUSCULAR; INTRAVENOUS EVERY 6 HOURS PRN
Status: DISCONTINUED | OUTPATIENT
Start: 2020-02-18 | End: 2020-02-20 | Stop reason: HOSPADM

## 2020-02-18 RX ORDER — AMOXICILLIN 250 MG
2 CAPSULE ORAL 2 TIMES DAILY
Status: DISCONTINUED | OUTPATIENT
Start: 2020-02-18 | End: 2020-02-20 | Stop reason: HOSPADM

## 2020-02-18 RX ORDER — HYDROMORPHONE HYDROCHLORIDE 1 MG/ML
.3-.5 INJECTION, SOLUTION INTRAMUSCULAR; INTRAVENOUS; SUBCUTANEOUS EVERY 10 MIN PRN
Status: DISCONTINUED | OUTPATIENT
Start: 2020-02-18 | End: 2020-02-18 | Stop reason: HOSPADM

## 2020-02-18 RX ORDER — PHENAZOPYRIDINE HYDROCHLORIDE 200 MG/1
200 TABLET, FILM COATED ORAL ONCE
Status: COMPLETED | OUTPATIENT
Start: 2020-02-18 | End: 2020-02-18

## 2020-02-18 RX ORDER — METOCLOPRAMIDE HYDROCHLORIDE 5 MG/ML
10 INJECTION INTRAMUSCULAR; INTRAVENOUS EVERY 6 HOURS PRN
Status: DISCONTINUED | OUTPATIENT
Start: 2020-02-18 | End: 2020-02-20 | Stop reason: HOSPADM

## 2020-02-18 RX ORDER — SODIUM CHLORIDE, SODIUM LACTATE, POTASSIUM CHLORIDE, CALCIUM CHLORIDE 600; 310; 30; 20 MG/100ML; MG/100ML; MG/100ML; MG/100ML
INJECTION, SOLUTION INTRAVENOUS CONTINUOUS
Status: DISCONTINUED | OUTPATIENT
Start: 2020-02-18 | End: 2020-02-20 | Stop reason: HOSPADM

## 2020-02-18 RX ORDER — KETOROLAC TROMETHAMINE 30 MG/ML
30 INJECTION, SOLUTION INTRAMUSCULAR; INTRAVENOUS EVERY 6 HOURS
Status: COMPLETED | OUTPATIENT
Start: 2020-02-18 | End: 2020-02-19

## 2020-02-18 RX ORDER — CEFAZOLIN SODIUM 2 G/100ML
2 INJECTION, SOLUTION INTRAVENOUS
Status: COMPLETED | OUTPATIENT
Start: 2020-02-18 | End: 2020-02-18

## 2020-02-18 RX ORDER — PROCHLORPERAZINE MALEATE 5 MG
10 TABLET ORAL EVERY 6 HOURS PRN
Status: DISCONTINUED | OUTPATIENT
Start: 2020-02-18 | End: 2020-02-20 | Stop reason: HOSPADM

## 2020-02-18 RX ORDER — ACETAMINOPHEN 325 MG/1
975 TABLET ORAL EVERY 8 HOURS
Status: DISCONTINUED | OUTPATIENT
Start: 2020-02-18 | End: 2020-02-20 | Stop reason: HOSPADM

## 2020-02-18 RX ADMIN — HYDROMORPHONE HYDROCHLORIDE 0.3 MG: 1 INJECTION, SOLUTION INTRAMUSCULAR; INTRAVENOUS; SUBCUTANEOUS at 14:08

## 2020-02-18 RX ADMIN — PROPOFOL 150 MG: 10 INJECTION, EMULSION INTRAVENOUS at 11:03

## 2020-02-18 RX ADMIN — HYDROMORPHONE HYDROCHLORIDE 0.3 MG: 1 INJECTION, SOLUTION INTRAMUSCULAR; INTRAVENOUS; SUBCUTANEOUS at 14:37

## 2020-02-18 RX ADMIN — FENTANYL CITRATE 50 MCG: 50 INJECTION, SOLUTION INTRAMUSCULAR; INTRAVENOUS at 11:06

## 2020-02-18 RX ADMIN — SODIUM CHLORIDE, POTASSIUM CHLORIDE, SODIUM LACTATE AND CALCIUM CHLORIDE: 600; 310; 30; 20 INJECTION, SOLUTION INTRAVENOUS at 22:39

## 2020-02-18 RX ADMIN — DEXAMETHASONE SODIUM PHOSPHATE 4 MG: 4 INJECTION, SOLUTION INTRAMUSCULAR; INTRAVENOUS at 11:28

## 2020-02-18 RX ADMIN — HYDROMORPHONE HYDROCHLORIDE 0.3 MG: 1 INJECTION, SOLUTION INTRAMUSCULAR; INTRAVENOUS; SUBCUTANEOUS at 15:14

## 2020-02-18 RX ADMIN — ONDANSETRON 4 MG: 2 INJECTION INTRAMUSCULAR; INTRAVENOUS at 13:18

## 2020-02-18 RX ADMIN — KETOROLAC TROMETHAMINE 30 MG: 30 INJECTION, SOLUTION INTRAMUSCULAR; INTRAVENOUS at 19:31

## 2020-02-18 RX ADMIN — LIDOCAINE HYDROCHLORIDE 60 MG: 20 INJECTION, SOLUTION INFILTRATION; PERINEURAL at 11:04

## 2020-02-18 RX ADMIN — SODIUM CHLORIDE, POTASSIUM CHLORIDE, SODIUM LACTATE AND CALCIUM CHLORIDE: 600; 310; 30; 20 INJECTION, SOLUTION INTRAVENOUS at 10:18

## 2020-02-18 RX ADMIN — FENTANYL CITRATE 50 MCG: 50 INJECTION, SOLUTION INTRAMUSCULAR; INTRAVENOUS at 11:53

## 2020-02-18 RX ADMIN — HYDROMORPHONE HYDROCHLORIDE 0.2 MG: 1 INJECTION, SOLUTION INTRAMUSCULAR; INTRAVENOUS; SUBCUTANEOUS at 14:19

## 2020-02-18 RX ADMIN — MIDAZOLAM 2 MG: 1 INJECTION INTRAMUSCULAR; INTRAVENOUS at 11:01

## 2020-02-18 RX ADMIN — ACETAMINOPHEN 975 MG: 325 TABLET, FILM COATED ORAL at 15:32

## 2020-02-18 RX ADMIN — BUPIVACAINE 20 ML: 13.3 INJECTION, SUSPENSION, LIPOSOMAL INFILTRATION at 10:25

## 2020-02-18 RX ADMIN — Medication 2 G: at 11:19

## 2020-02-18 RX ADMIN — TRANEXAMIC ACID 1 G: 1 INJECTION, SOLUTION INTRAVENOUS at 12:15

## 2020-02-18 RX ADMIN — SUGAMMADEX 150 MG: 100 INJECTION, SOLUTION INTRAVENOUS at 13:31

## 2020-02-18 RX ADMIN — SENNOSIDES AND DOCUSATE SODIUM 1 TABLET: 8.6; 5 TABLET ORAL at 19:32

## 2020-02-18 RX ADMIN — OXYCODONE HYDROCHLORIDE 10 MG: 5 TABLET ORAL at 19:31

## 2020-02-18 RX ADMIN — FENTANYL CITRATE 50 MCG: 50 INJECTION, SOLUTION INTRAMUSCULAR; INTRAVENOUS at 13:27

## 2020-02-18 RX ADMIN — KETOROLAC TROMETHAMINE 15 MG: 30 INJECTION, SOLUTION INTRAMUSCULAR at 13:16

## 2020-02-18 RX ADMIN — OXYCODONE HYDROCHLORIDE 5 MG: 5 TABLET ORAL at 15:32

## 2020-02-18 RX ADMIN — OXYCODONE HYDROCHLORIDE 5 MG: 5 TABLET ORAL at 17:39

## 2020-02-18 RX ADMIN — ROCURONIUM BROMIDE 40 MG: 10 INJECTION INTRAVENOUS at 11:02

## 2020-02-18 RX ADMIN — PROPOFOL 50 MG: 10 INJECTION, EMULSION INTRAVENOUS at 13:27

## 2020-02-18 RX ADMIN — HYDROMORPHONE HYDROCHLORIDE 0.5 MG: 1 INJECTION, SOLUTION INTRAMUSCULAR; INTRAVENOUS; SUBCUTANEOUS at 13:06

## 2020-02-18 RX ADMIN — FENTANYL CITRATE 25 MCG: 50 INJECTION, SOLUTION INTRAMUSCULAR; INTRAVENOUS at 13:59

## 2020-02-18 RX ADMIN — SODIUM CHLORIDE, POTASSIUM CHLORIDE, SODIUM LACTATE AND CALCIUM CHLORIDE: 600; 310; 30; 20 INJECTION, SOLUTION INTRAVENOUS at 11:56

## 2020-02-18 RX ADMIN — PROPOFOL 50 MG: 10 INJECTION, EMULSION INTRAVENOUS at 13:25

## 2020-02-18 RX ADMIN — SODIUM CHLORIDE, POTASSIUM CHLORIDE, SODIUM LACTATE AND CALCIUM CHLORIDE: 600; 310; 30; 20 INJECTION, SOLUTION INTRAVENOUS at 16:31

## 2020-02-18 RX ADMIN — PHENAZOPYRIDINE HYDROCHLORIDE 200 MG: 200 TABLET, FILM COATED ORAL at 09:46

## 2020-02-18 RX ADMIN — BUPIVACAINE HYDROCHLORIDE AND EPINEPHRINE BITARTRATE 20 ML: 2.5; .005 INJECTION, SOLUTION INFILTRATION; PERINEURAL at 10:25

## 2020-02-18 RX ADMIN — OXYCODONE HYDROCHLORIDE 10 MG: 5 TABLET ORAL at 22:28

## 2020-02-18 RX ADMIN — ROCURONIUM BROMIDE 10 MG: 10 INJECTION INTRAVENOUS at 11:43

## 2020-02-18 RX ADMIN — ACETAMINOPHEN 975 MG: 325 TABLET, FILM COATED ORAL at 22:28

## 2020-02-18 RX ADMIN — FENTANYL CITRATE 50 MCG: 50 INJECTION, SOLUTION INTRAMUSCULAR; INTRAVENOUS at 11:05

## 2020-02-18 ASSESSMENT — ACTIVITIES OF DAILY LIVING (ADL)
TRANSFERRING: 0-->INDEPENDENT
COGNITION: 0 - NO COGNITION ISSUES REPORTED
ADLS_ACUITY_SCORE: 10
BATHING: 0-->INDEPENDENT
RETIRED_EATING: 0-->INDEPENDENT
DRESS: 0-->INDEPENDENT
SWALLOWING: 0-->SWALLOWS FOODS/LIQUIDS WITHOUT DIFFICULTY
RETIRED_COMMUNICATION: 0-->UNDERSTANDS/COMMUNICATES WITHOUT DIFFICULTY
TOILETING: 0-->INDEPENDENT
AMBULATION: 0-->INDEPENDENT
FALL_HISTORY_WITHIN_LAST_SIX_MONTHS: NO

## 2020-02-18 ASSESSMENT — MIFFLIN-ST. JEOR: SCORE: 1393.5

## 2020-02-18 NOTE — LETTER
"    2/27/2020         Jatinder Torres   [address]  [facility] [address]        Dear Ms. Torres:    The results of the pathology from your recent surgery were normal-benign fibroids as expected.     Results for orders placed or performed during the hospital encounter of 02/18/20   POC US Guidance Needle Placement     Status: None    Impression    Bilateral TAP block   CBC with platelets     Status: Abnormal   Result Value Ref Range    WBC 3.8 (L) 4.0 - 11.0 10e9/L    RBC Count 5.15 3.8 - 5.2 10e12/L    Hemoglobin 11.6 (L) 11.7 - 15.7 g/dL    Hematocrit 37.9 35.0 - 47.0 %    MCV 74 (L) 78 - 100 fl    MCH 22.5 (L) 26.5 - 33.0 pg    MCHC 30.6 (L) 31.5 - 36.5 g/dL    RDW 27.0 (H) 10.0 - 15.0 %    Platelet Count 194 150 - 450 10e9/L   HCG quantitative pregnancy     Status: None   Result Value Ref Range    HCG Quantitative Serum <1 0 - 5 IU/L   Surgical pathology exam     Status: None   Result Value Ref Range    Copath Report       Patient Name: JATINDER TORRES  MR#: [id number]  Specimen #: [id number]  Collected: 2/18/2020  Received: 2/18/2020  Reported: 2/27/2020 10:30  Ordering Phy(s): GALLITO CONDON    For improved result formatting, select 'View Enhanced Report Format' under   Linked Documents section.    SPECIMEN(S):  Uterine fibroids    FINAL DIAGNOSIS:  UTERINE FIBROIDS, MYOMECTOMY:  - Leiomyomas (3.2 and 7.3 cm)  - Negative for malignancy    I have personally reviewed all specimens and/or slides, including the   listed special stains, and used them  with my medical judgement to determine or confirm the final diagnosis.    Electronically signed out by:    Jonathan Pat M.D., PhD, Physicians    CLINICAL HISTORY:  AUB    GROSS:  The specimen is received fresh with proper patient identification, labeled   \"uterine fibroids\".  The specimen  consists of 2 tan-white, ovoid, rubbery soft tissue fragments, 96.5 g in   aggregate weight and 7.3 and 3.2 cm  in greatest dimension. Both fragments are " s ectioned to reveal tan-white,   whorled cut surfaces without  hemorrhage or necrosis. Representative sections are submitted    Summary of Sections:  A1-A3 - representative sections of the larger nodule  A4-A5 - representative sections of the smaller nodule (Dictated by:   DOREEN Smiley 2/18/2020 02:20 PM)    MICROSCOPIC:  Microscopic examination was performed.    The technical component of this testing was completed at the [facility], with the professional component performed   at the [facility], [address] [place] [phone] ([phone])    CPT Codes:  A: 76325-FV3    COLLECTION SITE:  Client: [facility]  Location: UROR (B)       Hemoglobin     Status: Abnormal   Result Value Ref Range    Hemoglobin 10.2 (L) 11.7 - 15.7 g/dL   Glucose whole blood     Status: Abnormal   Result Value Ref Range    Glucose 101 (H) 70 - 99 mg/dL   Glucose by meter     Status: None   Result Value Ref Range    Glucose 75 70 - 99 mg/dL         Please note that test explanations are brief and do not reflect all diagnostic uses.  If you have any questions or concerns, please call the clinic at [phone].      Sincerely,      Vannesa Leung MD

## 2020-02-18 NOTE — OP NOTE
[facility]  Operative Note    Jatinder Guaman    2020   MRN [id number]      Date of Service: 2020   Surgeon: MD Shaunna Henriquez MD  Assistants: Rochelle Carrillo MD PGY-3          Kala Bertrand MD PGY-1     Pre-operative diagnosis:   AUB-L  Fibroid uterus  Infertility  Iron deficiency anemia    Post-operative diagnosis:   Same s/p abdominal myomectomy  Uterine fibroids excised without entry into endometrial cavity, but with extensive involvement of myometrium     Procedures: Abdominal myomectomy     Anesthesia: GETA  IVF: 1500 mL crystalloid  UOP: 300 mL, clear, yellow urine  EBL: 200 mL   Intraoperative medications: 2g ancef, 1g tranexamic acid    Indications: Jatinder Guaman is 34 year old  female with history of abnormal uterine bleeding with resulting iron deficiency anemia and infertility. Imaging has demonstrated large myomatous uterus with fibroids distorting the endometrial cavity. HSG revealed no tubal spillage. As fibroids were thought to be main cause of anemia and likely contributor to infertility, surgical removal via abdominal myomectomy was recommended. PSH significant for gynecologic surgery in [place]-pt not aware of what was done at that surgery. Risks, benefits and alternatives to above stated procedure were discussed. Patient desired to proceed and written informed consent was obtained prior to proceeding. TAPS were placed in preop    Complications:  None apparent    Findings: On exam under anesthesia, ~18 week size uterus noted. Normal appearing external genitalia, normal vagina, nulliparous cervix. Previous vertical midline scar ~1 cm wide. On laparotomy, moderate rectofascial adhesions, filmy adhesions from posterior uterus to bowel. Right adnexa encased in adhesions. No normal tube identified. Left ovary normal. Left tube somewhat dilated at the mid portion with normal-appearing fimbriae. Bladder adherent to  lower uterine segment. ~3 cm anterior subserosal  fibroid, large ~6 cm right lateral intramural fibroid, both removed through same incision. Small, ~2cm posterior fundal subserosal fibroid, not removed. Large, lateral intramural ~7 cm left sided fibroid from lower uterine segment nearly to the cervix. This fibroid was very close to left uterine vessels so not removed given significant risk of uncontrollable bleeding that could require hysterectomy.    Given extent of uterine surgery she should not be allowed to labor if she were ever to get pregnant. She would need delivery via  at 36-37 weeks for all future pregnancies.     Specimen: uterine fibroids    Procedure Details: The patient was taken back to the operating room where she was placed under general endotrachial anesthesia without difficulty. SCDs were in place. She was placed in dorsal lithotomy position with the Justo stirrups. She was prepped and draped in the usual sterile fashion. A safety time out was performed. 2g Ancef was administered. A speculum was placed in the vagina. The anterior lip of the cervix was grasped with a single tooth tenaculum. The cervix was dilated to 19 Syrian using Le dilators. A uterine manipulator was placed. The tenaculum and speculum were removed. A alvarado catheter was placed. An elliptical incision was made around her previous vertical midline skin incision  with the scalpel, and the scar was excised using cautery. The incision was carried down through the subcutaneous tissue sharply until the fascia was reached. Moderate fascial adhesions noted with thickened fascia taken down without difficulty. The fascia was incised in the midline using cautery, and intraperitoneal entry was noted to have occurred. The fascial incision was then extended superiorly and inferioly in layers using cautery. The peritoneal defect was then extended with cautery.     The uterus was identified and notable for findings as described  above. The uterus was unable to be exteriorized due to its bulk and posterior adhesions. Attempts were made to exteriorize it by removing the uterine manipulator without success. Filmy adhesions from posterior uterus to bowel were taken down with Metzenbaum scissors, but the uterus was still unable to be exteriorized.     A large Scout O retractor was placed. The patient was placed in Trendelenberg, and the bowel was packed away with laparotomy sponges. Attention was then turned to the anterior fibroid. Vasopressin was injected underneath serosa overlying the fibroid. Incision made with cautery down through serosa until fibroid was noted. The edges of incision were grasped with Allis clamp. Fibroid was then grasped with penetrating clamp, elevated and dissected out with Metzenbaum and cautery, and the fibroid easily . The large right sided fibroid was then identified through the same incision. The incision was extended superiorly and inferiorly with cautery. This incision involved a fair amount of myometrium. A penetrating clamp was used to grasp the fibroid and elevate it. Further dissection with scissors and cautery completed until fibroid was free on all sides. Base of fibroid was clamped with two curved Roxana clamps. The fibroid was  with Carroll scissors, and the surgical pedicles were tied using 2-0 Vicryl.  Endometrial cavity entry was not obvious. The myometrial incision was then repaired with -0-Vicryl in a running locked fashion. An area with heavy bleeding was reinforced with a horizontal mattress suture of 2-0 Vicryl. The serosa was closed with a running locked suture of 0 Monocryl. The incision was noted to be hemostatic. Due to proximity to the left uterine vessels, the left fibroid was not removed.  Seprafilm was placed over the uterine incision and over the fundus. The Scout O retractor was removed.    Kocher clamps were used to elevate the superior and inferior edges of the fascia.  The fascia was closed with a single 0-looped PDS suture in a running fashion. The subcutaneous tissue was undermined, then irrigated with saline and areas of bleeding were controlled with cautery. The subcutaneous tissue was closed with 3 horizontal mattress sutures of 3-0 Vicryl. The skin was closed with 4-0 Monocryl in a subcuticular fashion. Steri strips and a sterile bandage were placed.     All lap, instrument, and sharps counts were correct times two. Dr. Leung was scrubbed and present for the procedure.  Patient awoken in OR and taken to PACU in stable condition. Patient was admitted to for postoperative cares.    The assistance of Dr. Almaguer was required due to complexity of the case.    Rochelle Carrillo MD  Ob/Gyn PGY-3  02/18/20 2:28 PM    I was scrubbed and present for the entire procedure.  I have reviewed and edited the above note.  Dr. Almaguer's presence was required from the start of the myomectomy until the start of the incision closure due to the size of the uterus, inability to mobilize it well and pelvic adhesions present.    Vannesa Leung MD, FACOG

## 2020-02-18 NOTE — PROGRESS NOTES
"[facility]      Gyn Progress Note    Date: 2020    POD# 0  Procedure: Exploratory Laparotomy and Myomectomy  Diagnosis: Fibroid Uterus    Subjective: Patient is feeling well overall.  Pain is 6/10.  Tolerating ginger ale and apple sauce without nausea or vomiting.  Not passing flatus. Alvarado still in place. Not yet ambulating. Denies chest pain or shortness of breath. Denies dizziness.    Objective:   BP 94/60 (BP Location: Left arm)   Pulse 61   Temp 98.2  F (36.8  C) (Oral)   Resp 13   Ht 1.7 m (5' 6.93\")   Wt 65.7 kg (144 lb 13.5 oz)   SpO2 100%   BMI 22.73 kg/m      EXAM:   General: resting, in NAD  CV: regular rate  Resp: nonlabored breathing on room air  Abdomen: soft, nondistended  Incision: some shadowing through bandage  Extremities: SCDs in place    Labs/Imaging:  Results for orders placed or performed during the hospital encounter of 20 (from the past 24 hour(s))   CBC with platelets   Result Value Ref Range    WBC 3.8 (L) 4.0 - 11.0 10e9/L    RBC Count 5.15 3.8 - 5.2 10e12/L    Hemoglobin 11.6 (L) 11.7 - 15.7 g/dL    Hematocrit 37.9 35.0 - 47.0 %    MCV 74 (L) 78 - 100 fl    MCH 22.5 (L) 26.5 - 33.0 pg    MCHC 30.6 (L) 31.5 - 36.5 g/dL    RDW 27.0 (H) 10.0 - 15.0 %    Platelet Count 194 150 - 450 10e9/L   HCG quantitative pregnancy   Result Value Ref Range    HCG Quantitative Serum <1 0 - 5 IU/L   POC US Guidance Needle Placement    Impression    Bilateral TAP block     Assessment: Jatinder Guaman is a 34 year old female  POD#0 after exploratory laparotomy, abdominal myomectomy, and scar revision.    Plan:    FEN: regular diet, scheduled senna BID  Pain: has 3 doses of 30mg toradol, scheduled tylenol, prn oxycodone 5-10mg q3hr and dilaudid for breakthrough pain  Heme: stable, no symptoms of acute blood loss anemia.  GI: PRN antiemetics and stool softener.  : alvarado in place, plan to remove POD#1 as long as UOP adequate and ambulating  PPx: SCDs in place, encourage " ambulation    Disposition:  Anticipate discharge POD#2-3.    Kala Bertrand  Ob/Gyn PGY-1  2/18/20 [id number]

## 2020-02-18 NOTE — ANESTHESIA CARE TRANSFER NOTE
Patient: Jatinder Guaman    Procedure(s):  Exploratory Laparotomy and Myomectomy    Diagnosis: Abnormal uterine bleeding [N93.9]  Diagnosis Additional Information: No value filed.    Anesthesia Type:   General     Note:  Airway :Face Mask  Patient transferred to:PACU  Comments: Patient transferred to PACU with CRNA and RN. Vital signs stable. Report given to PACU RN.   Handoff Report: Identifed the Patient, Identified the Reponsible Provider, Reviewed the pertinent medical history, Discussed the surgical course, Reviewed Intra-OP anesthesia mangement and issues during anesthesia, Set expectations for post-procedure period and Allowed opportunity for questions and acknowledgement of understanding      Vitals: (Last set prior to Anesthesia Care Transfer)    CRNA VITALS  2/18/2020 1312 - 2/18/2020 1352      2/18/2020             NIBP:  119/77    Pulse:  94    Ht Rate:  94    Temp:  36.6  C (97.9  F)    SpO2:  100 %    Resp Rate (observed):  14                Electronically Signed By: MAGI aWng CRNA  February 18, 2020  1:52 PM

## 2020-02-18 NOTE — ANESTHESIA PROCEDURE NOTES
Peripheral Nerve Block Procedure Note  Date/Time: 2/18/2020 10:30 AM    Staff:     Anesthesiologist:  Luis Cordova MD  Location: Pre-op  Procedure Start/Stop TImes:      2/18/2020 10:25 AM     2/18/2020 10:30 AM    patient identified, IV checked, site marked, risks and benefits discussed, informed consent, monitors and equipment checked, pre-op evaluation, at physician/surgeon's request and post-op pain management      Correct Patient: Yes      Correct Position: Yes      Correct Site: Yes      Correct Procedure: Yes      Correct Laterality:  Yes    Site Marked:  Yes  Procedure details:     Procedure:  TAP    ASA:  2    Laterality:  Bilateral    Position:  Supine    Sterile Prep: chloraprep, mask and sterile gloves      Needle:  Short bevel    Needle gauge:  21    Needle length (inches):  4    Ultrasound: Yes      Ultrasound used to identify targeted nerve, plexus, or vascular structure and placed a needle adjacent to it      Permanent Image entered into patiient's record      Abnormal pain on injection: No      Blood Aspirated: No      Paresthesias:  No    Bleeding at site: No      Test dose negative for signs of intravascular injection: Yes      Bolus via:  Needle    Infusion Method:  Single Shot    Complications:  None  Assessment/Narrative:     Injection made incrementally with aspirations every (mL):  5

## 2020-02-18 NOTE — DISCHARGE SUMMARY
Gynecology Discharge Summary    Jatinder Guaman    YOB: 1985  MRN# [id number]   Age: 34 year old         Date of Admission:  2/18/2020  Date of Discharge:  2/20/2020  Admitting Physician:  Vannesa Leung MD  Discharge Physician:  Ynes Garcia MD  Discharging Service:  Gynecology     Primary Provider: No Ref-Primary, Physician          Admission Diagnoses:   AUB-L  Fibroid uterus  Infertility  Iron deficiency anemia  Asthma          Discharge Diagnosis:   Same, s/p procedure below             Discharge Disposition:   Discharged to home           Procedures:   Abdominal myomectomy  TAP Block          Medications Prior to Admission:     Medications Prior to Admission   Medication Sig Dispense Refill Last Dose     albuterol (PROAIR HFA/PROVENTIL HFA/VENTOLIN HFA) 108 (90 Base) MCG/ACT inhaler Inhale 2 puffs into the lungs every 6 hours 1 Inhaler 0 Past Month at Unknown time     ferrous sulfate (FEROSUL) 325 (65 Fe) MG tablet Take 325 mg by mouth daily (with breakfast)   Past Week at Unknown time     docusate (COLACE) 50 MG/5ML liquid Take 5 mLs (50 mg) by mouth 2 times daily as needed for constipation 473 mL 0 Unknown at Unknown time             Discharge Medications:     Current Discharge Medication List      START taking these medications    Details   acetaminophen 325 MG PO tablet Take 1-2 tablets (325-650 mg) by mouth every 4 hours as needed for pain  Qty: 30 tablet, Refills: 0    Associated Diagnoses: S/P myomectomy      ibuprofen 600 MG PO tablet Take 1 tablet (600 mg) by mouth every 6 hours as needed for moderate pain  Qty: 30 tablet, Refills: 0    Associated Diagnoses: S/P myomectomy      oxyCODONE 5 MG PO tablet Take 1 tablet (5 mg) by mouth every 6 hours as needed for pain  Qty: 12 tablet, Refills: 0    Associated Diagnoses: S/P myomectomy      senna-docusate 8.6-50 MG PO tablet Take 1 tablet by mouth daily  Qty: 30 tablet, Refills: 0    Associated Diagnoses: S/P myomectomy          CONTINUE these medications which have NOT CHANGED    Details   albuterol (PROAIR HFA/PROVENTIL HFA/VENTOLIN HFA) 108 (90 Base) MCG/ACT inhaler Inhale 2 puffs into the lungs every 6 hours  Qty: 1 Inhaler, Refills: 0    Comments: Pharmacy may dispense brand covered by insurance (Proair, or proventil or ventolin or generic albuterol inhaler)  Associated Diagnoses: Mild intermittent asthma without complication      ferrous sulfate (FEROSUL) 325 (65 Fe) MG tablet Take 325 mg by mouth daily (with breakfast)      docusate (COLACE) 50 MG/5ML liquid Take 5 mLs (50 mg) by mouth 2 times daily as needed for constipation  Qty: 473 mL, Refills: 0    Associated Diagnoses: Microcytic anemia                   Consultations:   Consultation during this admission received from anesthesia             Brief History of Illness:   Jatinder Guaman is 34 year old  female with history of abnormal uterine bleeding with resulting iron deficiency anemia and infertility. Imaging has demonstrated large myomatous uterus with fibroids distorting the endometrial cavity. HSG revealed no tubal spillage. As fibroids were thought to be main cause of anemia and likely contributor to infertility, surgical removal via abdominal myomectomy was recommended. PSH significant for gynecologic surgery. Risks, benefits and alternatives to above stated procedure were discussed. Patient desired to proceed and written informed consent was obtained prior to proceeding.           Hospital Course:   She underwent abdominal myomectomy. The procedure was uncomplicated with an EBL of 200 ml.     Operative findings:   On exam under anesthesia, ~18 week size uterus noted. Normal appearing external genitalia, normal vagina, nulliparous cervix. Previous vertical midline scar ~1 cm wide. On laparotomy, moderate rectofascial adhesions, filmy adhesions from posterior uterus to bowel. Right adnexa encased in adhesions. No normal tube identified. Left ovary normal. Left  tube somewhat dilated with fiilmy adhesions to uterus. Normal-appearing fimbriae. Bladder adherent to cervix. ~3 cm anterior fibroid, large ~6 cm right fibroid, both removed through same adhesion. Small, ~2cm posterior fibroid, not removed. Large ~7 cm left sided fibroid from lower uterine segment to right cornua, seeming to involve full thickness of uterus. This fibroid was very close to right uterine vessels so not removed.     Postoperatively, she recovered as expected. At time of discharge on POD#2, patient was ambulating without lightheadedness/dizziness, pain was well controlled, she was tolerating a regular diet without n/v, and she was voiding on her own. Had a bowel movement on POD#1.      DC Hemoglobin was stable at 10.2          Discharge Instructions and Follow-Up:   Discharge diet: Regular   Discharge activity: No heavy lifting, pushing, pulling for 6 week(s)  No sex for 6 week(s)  No driving or operating machinery while on narcotic analgesics   Discharge follow-up: Follow up as scheduled on 3/19/2020 with Dr Leung   Other instructions: None      Kala Bertrand MD  2/20/20 443      I have seen, examined, and counseled the patient on the day of discharge. I have reviewed and edited the summary.  Ynes Garcia

## 2020-02-18 NOTE — BRIEF OP NOTE
[facility], [place]    Brief Operative Note    Pre-operative diagnosis: Abnormal uterine bleeding [N93.9]      Infertility      Fibroid uterus  Post-operative diagnosis Same as pre-operative diagnosis    Procedure: Procedure(s):  Exploratory Laparotomy and Myomectomy  Surgeon: Surgeon(s) and Role:     * Vannesa Leung MD - Primary     * Shaunna Almaguer MD - Assisting     * Rochelle Carrillo MD - Resident - Assisting       Kala Bertrand MD - Resident - Assisting  Anesthesia: General   Estimated blood loss: 200 ml  IVF: 1500 ml chrystalloid  UOP: 300 ml clear urine  Drains: None  Specimens:   ID Type Source Tests Collected by Time Destination   A : uterine fibroids Tissue Uterus SURGICAL PATHOLOGY EXAM Vannesa Leung MD 2/18/2020 12:40 PM      Findings:   On exam under anesthesia, ~18 week size uterus noted. Normal appearing external genitalia, normal vagina, nulliparous cervix. Previous vertical midline scar ~1 cm wide. On laparotomy, moderate rectofascial adhesions, filmy adhesions from posterior uterus to bowel. Right adnexa encased in adhesions. No normal tube identified. Left ovary normal. Left tube somewhat dilated with fiilmy adhesions to uterus. Normal-appearing fimbriae. Bladder adherent to cervix. ~3 cm anterior fibroid, large ~6 cm right fibroid, both removed through same adhesion. Small, ~2cm posterior fibroid, not removed. Large ~7 cm left sided fibroid from lower uterine segment to right cornua, seeming to involve full thickness of uterus. Very close to right uterine vessels so not removed. .  Complications: None.    Rochelle Carrillo MD  Ob/Gyn PGY-3  02/18/20 1:30 PM

## 2020-02-18 NOTE — ANESTHESIA PREPROCEDURE EVALUATION
"Anesthesia Pre-Procedure Evaluation    Patient: Jatinder Guaman   MRN:     [id number] Gender:   female   Age:    34 year old :      1985        Preoperative Diagnosis: Abnormal uterine bleeding [N93.9]   Procedure(s):  Abdominal Myomectomy     LABS:  CBC:   Lab Results   Component Value Date    WBC 3.8 (L) 2020    WBC 4.1 2020    HGB 11.6 (L) 2020    HGB 11.1 (L) 2020    HCT 37.9 2020    HCT 37.2 2020     2020     2020     BMP:   Lab Results   Component Value Date     2019    POTASSIUM 4.0 2019    CHLORIDE 108 2019    CO2 25 2019    BUN 10 2019    CR 0.86 2019    GLC 96 2019     COAGS: No results found for: PTT, INR, FIBR  POC:   Lab Results   Component Value Date    HCG Negative 2019     OTHER:   Lab Results   Component Value Date    MISTY 8.7 2019    TSH 0.55 2019        Preop Vitals    BP Readings from Last 3 Encounters:   20 114/87   20 102/56   20 110/57    Pulse Readings from Last 3 Encounters:   20 63   20 54   20 53      Resp Readings from Last 3 Encounters:   20 18   20 16   20 18    SpO2 Readings from Last 3 Encounters:   20 98%   20 100%   20 100%      Temp Readings from Last 1 Encounters:   20 37.2  C (99  F) (Oral)    Ht Readings from Last 1 Encounters:   20 1.7 m (5' 6.93\")      Wt Readings from Last 1 Encounters:   20 65.7 kg (144 lb 13.5 oz)    Estimated body mass index is 22.73 kg/m  as calculated from the following:    Height as of this encounter: 1.7 m (5' 6.93\").    Weight as of this encounter: 65.7 kg (144 lb 13.5 oz).     LDA:        Past Medical History:   Diagnosis Date     NO ACTIVE PROBLEMS       Past Surgical History:   Procedure Laterality Date     APPENDECTOMY       DILATION AND CURETTAGE       GYN SURGERY        Allergies   Allergen Reactions     Aspirin Hives    "     Anesthesia Evaluation     . Pt has had prior anesthetic.            ROS/MED HX    ENT/Pulmonary:       Neurologic:       Cardiovascular:         METS/Exercise Tolerance:     Hematologic:         Musculoskeletal:         GI/Hepatic:         Renal/Genitourinary:         Endo:         Psychiatric:         Infectious Disease:         Malignancy:         Other:                     KAILEE CERNA PHYSICAL EXAM    Assessment:   ASA SCORE: 2      Smoking Status:  Non-Smoker/Unknown   NPO Status: NPO Appropriate     Plan:   Anes. Type:  General   Pre-Medication: None   Induction:  IV (Standard)   Airway: ETT; Oral   Access/Monitoring: PIV   Maintenance: Balanced     Postop Plan:   Postop Pain: Opioids  Postop Sedation/Airway: Not planned     PONV Management:   Adult Risk Factors: Female, Non-Smoker, Postop Opioids                   Vandana Winkler MD

## 2020-02-18 NOTE — ANESTHESIA POSTPROCEDURE EVALUATION
Anesthesia POST Procedure Evaluation    Patient: Jatinder Guaman   MRN:     [id number] Gender:   female   Age:    34 year old :      1985        Preoperative Diagnosis: Abnormal uterine bleeding [N93.9]   Procedure(s):  Exploratory Laparotomy and Myomectomy   Postop Comments: No value filed.     Anesthesia Type: General          Postop Pain Control: Uneventful            Sign Out: Well controlled pain   PONV: No   Neuro/Psych: Uneventful            Sign Out: Acceptable/Baseline neuro status   Airway/Respiratory: Uneventful            Sign Out: Acceptable/Baseline resp. status   CV/Hemodynamics: Uneventful            Sign Out: Acceptable CV status   Other NRE: NONE   DID A NON-ROUTINE EVENT OCCUR? No         Last Anesthesia Record Vitals:  CRNA VITALS  2020 1312 - 2020 1412      2020             NIBP:  119/77    Pulse:  94    Ht Rate:  94    Temp:  36.6  C (97.9  F)    SpO2:  100 %    Resp Rate (observed):  14          Last PACU Vitals:  Vitals Value Taken Time   BP 99/69 2020  3:30 PM   Temp 36.4  C (97.5  F) 2020  2:15 PM   Pulse 70 2020  3:30 PM   Resp 29 2020  3:33 PM   SpO2 98 % 2020  3:33 PM   Temp src     NIBP     Pulse     SpO2     Resp     Temp     Ht Rate     Temp 2     Vitals shown include unvalidated device data.      Electronically Signed By: Milady Kline MD, 2020, 3:34 PM

## 2020-02-18 NOTE — OR NURSING
PACU to Inpatient Nursing Handoff    Patient Jatinder Guaman is a 34 year old female who speaks English.   Procedure Procedure(s):  Exploratory Laparotomy and Myomectomy   Surgeon(s) Primary: Vannesa Leung MD  Assisting: Shaunna Almaguer MD  Resident - Assisting: Rochelle Carrillo MD     Allergies   Allergen Reactions     Aspirin Hives       Isolation  No active isolations     Past Medical History   has a past medical history of NO ACTIVE PROBLEMS.    Anesthesia General   Dermatome Level     Preop Meds phenazopyridine (Pyridium) - time given: 1000   Nerve block Transversus abdominus plane (TAP).  Location:bilateral. Med:bupivacaine and Exparel (liposomal bupivacaine). Time given: 1030   Intraop Meds dexamethasone (Decadron)  fentanyl (Sublimaze): 200 mcg total  hydromorphone (Dilaudid): 0.5 mg total  ketorolac (Toradol): last given at 1316  ondansetron (Zofran): last given at 1318  propofol, muscle relaxant, fully reversed, TXA 1g-1215, Vasopressin (20units/1ml) 3mls injected to surgical site.   Local Meds No   Antibiotics cefazolin (Ancef) - last given at 1119     Pain Patient Currently in Pain: yes  Comfort: tolerable with discomfort  Pain Control: partially effective   PACU meds  fentanyl (Sublimaze): 25 mcg (total dose) last given at    hydromorphone (Dilaudid): 1.1 mg (total dose) last given at 1514  Tylenol 975 mg and oxycodone 5 mg given at 1532    PCA / epidural No   Capnography Respiratory Monitoring (EtCO2): 38 mmHg   Telemetry ECG Rhythm: Normal sinus rhythm   Inpatient Telemetry Monitor Ordered? No        Labs Glucose Lab Results   Component Value Date    GLC 96 09/25/2019       Hgb Lab Results   Component Value Date    HGB 11.6 02/18/2020       INR No results found for: INR   PACU Imaging Not applicable     Wound/Incision Incision/Surgical Site 02/18/20 Abdomen (Active)   Incision Assessment UT 2/18/2020  3:15 PM   Ginger-Incision Assessment UTV 2/18/2020  3:15 PM   Closure RAFFY  2/18/2020  3:15 PM   Incision Drainage Amount Moderate 2/18/2020  3:15 PM   Dressing Intervention Moist drainage 2/18/2020  3:15 PM   Number of days: 0      CMS        Equipment ice pack   Other LDA       IV Access Peripheral IV 02/18/20 Right Wrist (Active)   Site Assessment WDL 2/18/2020  3:15 PM   Line Status Infusing 2/18/2020  3:15 PM   Phlebitis Scale 0-->no symptoms 2/18/2020  3:15 PM   Infiltration Scale 0 2/18/2020  3:15 PM   Infiltration Site Treatment Method  None 2/18/2020  9:47 AM   Extravasation? No 2/18/2020  3:00 PM   Dressing Intervention New dressing  2/18/2020  9:47 AM   Number of days: 0      Blood Products Not applicable  mL   Intake/Output Date 02/18/20 0700 - 02/19/20 0659   Shift 2423-3157 7446-4678 7253-5204 24 Hour Total   INTAKE   I.V. 1600   1600   Shift Total(mL/kg) 1600(24.35)   1600(24.35)   OUTPUT   Urine 300   300   Blood 200   200   Shift Total(mL/kg) 500(7.61)   500(7.61)   Weight (kg) 65.7 65.7 65.7 65.7      Drains / Alvarado Urethral Catheter Straight-tip;Latex 16 fr (Active)   Collection Container Standard 2/18/2020  2:00 PM   Securement Method Leg strap 2/18/2020  2:00 PM   Rationale for Continued Use /GI/GYN Pelvic Procedure 2/18/2020  2:00 PM   Number of days: 0      Time of void PreOp Void Prior to Procedure: 0915 (02/18/20 0938)    PostOp  alvarado in place    Diapered? No   Bladder Scan     PO   applesauce water     Vitals    B/P: 111/76  T: 98.4  F (36.9  C)    Temp src: Axillary  P:  Pulse: 60 (02/18/20 1400)    Heart Rate: 66 (02/18/20 1400)     R: 12  O2:  SpO2: 100 %    O2 Device: None (Room air) (02/18/20 1515)    Oxygen Delivery: n/a         Family/support present significant other   Patient belongings     Patient transported on cart   DC meds/scripts (obs/outpt) Not applicable   Inpatient Pain Meds Released? Yes       Special needs/considerations None   Tasks needing completion None       Helen Elliott RN  ASCOM [id number]

## 2020-02-19 LAB
GLUCOSE BLD-MCNC: 101 MG/DL (ref 70–99)
HGB BLD-MCNC: 10.2 G/DL (ref 11.7–15.7)

## 2020-02-19 PROCEDURE — 12000001 ZZH R&B MED SURG/OB UMMC

## 2020-02-19 PROCEDURE — 82947 ASSAY GLUCOSE BLOOD QUANT: CPT | Performed by: OBSTETRICS & GYNECOLOGY

## 2020-02-19 PROCEDURE — 25000132 ZZH RX MED GY IP 250 OP 250 PS 637: Performed by: STUDENT IN AN ORGANIZED HEALTH CARE EDUCATION/TRAINING PROGRAM

## 2020-02-19 PROCEDURE — 25000128 H RX IP 250 OP 636: Performed by: STUDENT IN AN ORGANIZED HEALTH CARE EDUCATION/TRAINING PROGRAM

## 2020-02-19 PROCEDURE — 36415 COLL VENOUS BLD VENIPUNCTURE: CPT | Performed by: STUDENT IN AN ORGANIZED HEALTH CARE EDUCATION/TRAINING PROGRAM

## 2020-02-19 PROCEDURE — 85018 HEMOGLOBIN: CPT | Performed by: STUDENT IN AN ORGANIZED HEALTH CARE EDUCATION/TRAINING PROGRAM

## 2020-02-19 PROCEDURE — 25800030 ZZH RX IP 258 OP 636: Performed by: STUDENT IN AN ORGANIZED HEALTH CARE EDUCATION/TRAINING PROGRAM

## 2020-02-19 RX ORDER — BISACODYL 10 MG
10 SUPPOSITORY, RECTAL RECTAL ONCE
Status: COMPLETED | OUTPATIENT
Start: 2020-02-19 | End: 2020-02-19

## 2020-02-19 RX ORDER — ACETAMINOPHEN 325 MG/1
325-650 TABLET ORAL EVERY 4 HOURS PRN
Qty: 30 TABLET | Refills: 0 | Status: SHIPPED | OUTPATIENT
Start: 2020-02-19 | End: 2020-03-20

## 2020-02-19 RX ORDER — IBUPROFEN 600 MG/1
600 TABLET, FILM COATED ORAL EVERY 6 HOURS PRN
Qty: 30 TABLET | Refills: 0 | Status: SHIPPED | OUTPATIENT
Start: 2020-02-19

## 2020-02-19 RX ORDER — AMOXICILLIN 250 MG
1 CAPSULE ORAL DAILY
Qty: 30 TABLET | Refills: 0 | Status: SHIPPED | OUTPATIENT
Start: 2020-02-19

## 2020-02-19 RX ORDER — LIDOCAINE 4 G/G
2 PATCH TOPICAL
Status: DISCONTINUED | OUTPATIENT
Start: 2020-02-19 | End: 2020-02-20 | Stop reason: HOSPADM

## 2020-02-19 RX ADMIN — ALBUTEROL SULFATE 2 PUFF: 90 AEROSOL, METERED RESPIRATORY (INHALATION) at 16:15

## 2020-02-19 RX ADMIN — IBUPROFEN 600 MG: 600 TABLET ORAL at 19:19

## 2020-02-19 RX ADMIN — ACETAMINOPHEN 975 MG: 325 TABLET, FILM COATED ORAL at 23:42

## 2020-02-19 RX ADMIN — IBUPROFEN 600 MG: 600 TABLET ORAL at 14:25

## 2020-02-19 RX ADMIN — OXYCODONE HYDROCHLORIDE 10 MG: 5 TABLET ORAL at 04:29

## 2020-02-19 RX ADMIN — SODIUM CHLORIDE, POTASSIUM CHLORIDE, SODIUM LACTATE AND CALCIUM CHLORIDE: 600; 310; 30; 20 INJECTION, SOLUTION INTRAVENOUS at 06:19

## 2020-02-19 RX ADMIN — ACETAMINOPHEN 975 MG: 325 TABLET, FILM COATED ORAL at 08:02

## 2020-02-19 RX ADMIN — OXYCODONE HYDROCHLORIDE 5 MG: 5 TABLET ORAL at 09:25

## 2020-02-19 RX ADMIN — SENNOSIDES AND DOCUSATE SODIUM 2 TABLET: 8.6; 5 TABLET ORAL at 08:02

## 2020-02-19 RX ADMIN — OXYCODONE HYDROCHLORIDE 10 MG: 5 TABLET ORAL at 01:22

## 2020-02-19 RX ADMIN — LIDOCAINE 2 PATCH: 560 PATCH PERCUTANEOUS; TOPICAL; TRANSDERMAL at 16:13

## 2020-02-19 RX ADMIN — KETOROLAC TROMETHAMINE 30 MG: 30 INJECTION, SOLUTION INTRAMUSCULAR; INTRAVENOUS at 01:22

## 2020-02-19 RX ADMIN — SENNOSIDES AND DOCUSATE SODIUM 2 TABLET: 8.6; 5 TABLET ORAL at 19:19

## 2020-02-19 RX ADMIN — ALBUTEROL SULFATE 2 PUFF: 90 AEROSOL, METERED RESPIRATORY (INHALATION) at 21:35

## 2020-02-19 RX ADMIN — KETOROLAC TROMETHAMINE 30 MG: 30 INJECTION, SOLUTION INTRAMUSCULAR; INTRAVENOUS at 08:03

## 2020-02-19 RX ADMIN — ACETAMINOPHEN 975 MG: 325 TABLET, FILM COATED ORAL at 16:13

## 2020-02-19 RX ADMIN — BISACODYL 10 MG: 10 SUPPOSITORY RECTAL at 16:13

## 2020-02-19 RX ADMIN — OXYCODONE HYDROCHLORIDE 5 MG: 5 TABLET ORAL at 16:12

## 2020-02-19 RX ADMIN — OXYCODONE HYDROCHLORIDE 5 MG: 5 TABLET ORAL at 23:42

## 2020-02-19 ASSESSMENT — ACTIVITIES OF DAILY LIVING (ADL)
ADLS_ACUITY_SCORE: 12
ADLS_ACUITY_SCORE: 10
ADLS_ACUITY_SCORE: 10
ADLS_ACUITY_SCORE: 12
ADLS_ACUITY_SCORE: 10
ADLS_ACUITY_SCORE: 12

## 2020-02-19 NOTE — PROGRESS NOTES
"[facility]      Gyn Progress Note    Date: 2020    POD# 1  Procedure: Exploratory Laparotomy and Myomectomy    Subjective: Patient is up and trying to walk. Has gas pain and has not passed flatus yet. Denies nausea or vomiting. Partner at bedside.     Objective:   BP 90/59 (BP Location: Left arm)   Pulse 60   Temp 98.8  F (37.1  C) (Oral)   Resp 16   Ht 1.7 m (5' 6.93\")   Wt 65.7 kg (144 lb 13.5 oz)   SpO2 100%   BMI 22.73 kg/m      EXAM:   General: resting, in NAD  CV: regular rate  Resp: nonlabored breathing on room air    Labs/Imaging:  Results for orders placed or performed during the hospital encounter of 20 (from the past 24 hour(s))   Hemoglobin   Result Value Ref Range    Hemoglobin 10.2 (L) 11.7 - 15.7 g/dL   Glucose whole blood   Result Value Ref Range    Glucose 101 (H) 70 - 99 mg/dL     Assessment: Jatinder Guaman is a 34 year old female  POD#1 after exploratory laparotomy, abdominal myomectomy, and scar revision.    Plan:    FEN: regular diet, scheduled senna BID. Will try bisacodyl suppository this pm.    Pain: scheduled ibuprofen, scheduled tylenol, prn oxycodone 5-10mg q3hr. Will add lidocaine patches.  Heme: postop hgb of 10.2   : alvarado out, voiding spontaneously  PPx: encouraged ambulation    Disposition:  Anticipate discharge POD#2-3.    Kala Bertrand MD  Obstetrics and Gynecology PGY-1  20 1607      "

## 2020-02-19 NOTE — PROGRESS NOTES
"SPIRITUAL HEALTH SERVICES  SPIRITUAL ASSESSMENT Progress Note  [facility] ([facility] [place]) [address]     REFERRAL SOURCE: Pt request during initial nursing assessment    Attempted visit twice. Pt asleep soundly this morning. This afternoon, pt opened eyes when I opened door but stated \"maybe another time, I just want to get some rest.\"    PLAN: Will offer visit on Friday when in next.    Shilpa Pineda  Chaplain Resident  Pager [phone]    [facility] remains available 24/7 for emergent requests/referrals, either by having the switchboard page the on-call  or by entering an ASAP/STAT consult in Epic (this will also page the on-call ).     "

## 2020-02-19 NOTE — PROGRESS NOTES
"[facility]      Gyn Progress Note    Date: 2020    POD# 1  Procedure: Exploratory Laparotomy and Myomectomy    Subjective: Patient is feeling well overall. Says her pain is less than yesterday.  Tolerating more food without nausea or vomiting. Alvarado still in place. Not yet ambulating. Denies chest pain or shortness of breath. Denies headache or vision changes. No other concerns at this time.  at bedside.    Objective:   BP 97/43 (BP Location: Left arm)   Pulse 63   Temp 98.5  F (36.9  C) (Oral)   Resp 16   Ht 1.7 m (5' 6.93\")   Wt 65.7 kg (144 lb 13.5 oz)   SpO2 100%   BMI 22.73 kg/m      EXAM:   General: resting, in NAD  CV: regular rate  Resp: nonlabored breathing on room air  Abdomen: soft, nondistended  Incision: some shadowing through bandage  Extremities: SCDs in place    Labs/Imaging:  Results for orders placed or performed during the hospital encounter of 20 (from the past 24 hour(s))   CBC with platelets   Result Value Ref Range    WBC 3.8 (L) 4.0 - 11.0 10e9/L    RBC Count 5.15 3.8 - 5.2 10e12/L    Hemoglobin 11.6 (L) 11.7 - 15.7 g/dL    Hematocrit 37.9 35.0 - 47.0 %    MCV 74 (L) 78 - 100 fl    MCH 22.5 (L) 26.5 - 33.0 pg    MCHC 30.6 (L) 31.5 - 36.5 g/dL    RDW 27.0 (H) 10.0 - 15.0 %    Platelet Count 194 150 - 450 10e9/L   HCG quantitative pregnancy   Result Value Ref Range    HCG Quantitative Serum <1 0 - 5 IU/L   POC US Guidance Needle Placement    Impression    Bilateral TAP block     Assessment: Jatinder Guaman is a 34 year old female  POD#1 after exploratory laparotomy, abdominal myomectomy, and scar revision.    Plan:    FEN: regular diet, scheduled senna BID  Pain: scheduled ibuprofen, scheduled tylenol, prn oxycodone 5-10mg q3hr and dilaudid for breakthrough pain  Heme: AM hgb pending. EBL of 200mLs. no symptoms of acute blood loss anemia.  GI: PRN antiemetics and scheduled stool softener.  : alvarado in place, plan to remove today as long as UOP adequate " and ambulating  PPx: SCDs in place, encourage ambulation    Disposition:  Anticipate discharge POD#2-3.    Kala Bertrand MD  Obstetrics and Gynecology PGY-1  2/19/20 651    Staff MD Note    I appreciate the note by Dr. Bertrand.  Any necessary changes have been made by me.  I saw and evaluated the patient and agree with the findings and plan of care as documented in the note.  Working on pain control.  Tolerated PO this morning.  Has voided x 1 since catheter removal.  Limited time out of bed, encouraged her to get up and move around four times today with assistance.  Otherwise making appropriate progress POD#1.    Oxana Zuniga MD

## 2020-02-19 NOTE — PLAN OF CARE
VSS, soft BP- asymptomatic, afebrile, A&Ox4, calm and quiet, Capno-WNL on RA, denies nausea, dizziness, SOB or CP, tolerating pills without difficulty, passing gas, alvarado output - good, ham clear, dressing has moderate dried drainage- unchanged, mild vag spotting, repositions self in bed, ice to incision, PCDs on, SO in the room on recliner, rounded frequently on pt and offered pain meds as needed, will continue to monitor and assist.

## 2020-02-19 NOTE — PLAN OF CARE
"      VS:   BP 90/59 (BP Location: Left arm)   Pulse 60   Temp 98.8  F (37.1  C) (Oral)   Resp 16   Ht 1.7 m (5' 6.93\")   Wt 65.7 kg (144 lb 13.5 oz)   SpO2 100%   BMI 22.73 kg/m      VSS. BP's soft but pt is asymptomatic. Denies any CP/SOB. Slightly dizzy when first standing up, but quickly resolved and pt was able to ambulate w/no issues. O2 sats upper 90's on RA. LS clear   Output:   Voiding adequate amounts (300ml and 500ml), difficulty to PVR d/t midline incision but attempts yielded low results. BS+/flatus-  Scant spotting on elis-pad   Activity:   SBA w/walker. ABD binder on for comfort   Skin: No issues except incision   Pain:   Pain in incisional area managed w/oxy 5mg, ice packs and abd binder   Neuro/CMS:   A&Ox4. Cms+   Dressing(s):   Dried drainage on dressing, marked and unchanged. OB/gyn aware   Diet:   Reg diet, tolerating w/good appetite, no n/v   LDA:   R PIV SL   Equipment:      Plan:   Discharge to home when medically stable   Additional Info:          "

## 2020-02-20 VITALS
TEMPERATURE: 98.4 F | WEIGHT: 143.6 LBS | OXYGEN SATURATION: 99 % | BODY MASS INDEX: 22.54 KG/M2 | DIASTOLIC BLOOD PRESSURE: 63 MMHG | SYSTOLIC BLOOD PRESSURE: 97 MMHG | HEART RATE: 58 BPM | RESPIRATION RATE: 16 BRPM | HEIGHT: 67 IN

## 2020-02-20 LAB — GLUCOSE BLDC GLUCOMTR-MCNC: 75 MG/DL (ref 70–99)

## 2020-02-20 PROCEDURE — 25000132 ZZH RX MED GY IP 250 OP 250 PS 637: Performed by: STUDENT IN AN ORGANIZED HEALTH CARE EDUCATION/TRAINING PROGRAM

## 2020-02-20 PROCEDURE — 00000146 ZZHCL STATISTIC GLUCOSE BY METER IP

## 2020-02-20 RX ORDER — OXYCODONE HYDROCHLORIDE 5 MG/1
5 TABLET ORAL EVERY 6 HOURS PRN
Qty: 12 TABLET | Refills: 0 | Status: SHIPPED | OUTPATIENT
Start: 2020-02-20 | End: 2020-02-23

## 2020-02-20 RX ADMIN — ALBUTEROL SULFATE 2 PUFF: 90 AEROSOL, METERED RESPIRATORY (INHALATION) at 08:30

## 2020-02-20 RX ADMIN — OXYCODONE HYDROCHLORIDE 5 MG: 5 TABLET ORAL at 13:25

## 2020-02-20 RX ADMIN — ACETAMINOPHEN 975 MG: 325 TABLET, FILM COATED ORAL at 13:25

## 2020-02-20 RX ADMIN — OXYCODONE HYDROCHLORIDE 5 MG: 5 TABLET ORAL at 07:11

## 2020-02-20 RX ADMIN — IBUPROFEN 600 MG: 600 TABLET ORAL at 16:23

## 2020-02-20 RX ADMIN — IBUPROFEN 600 MG: 600 TABLET ORAL at 09:18

## 2020-02-20 RX ADMIN — SENNOSIDES AND DOCUSATE SODIUM 2 TABLET: 8.6; 5 TABLET ORAL at 09:18

## 2020-02-20 RX ADMIN — OXYCODONE HYDROCHLORIDE 5 MG: 5 TABLET ORAL at 16:23

## 2020-02-20 RX ADMIN — ACETAMINOPHEN 975 MG: 325 TABLET, FILM COATED ORAL at 16:23

## 2020-02-20 ASSESSMENT — ACTIVITIES OF DAILY LIVING (ADL)
ADLS_ACUITY_SCORE: 12

## 2020-02-20 ASSESSMENT — MIFFLIN-ST. JEOR: SCORE: 1387.87

## 2020-02-20 NOTE — PROGRESS NOTES
"[facility]      Gyn Progress Note    Date: 2020    POD# 2  Procedure: Exploratory Laparotomy and Myomectomy    Subjective: Patient is feeling much better today. Pain is well-controlled. Has been passing flatus and had a bowel movement yesterday. Voiding spontaneously. Tolerating regular diet w/o nausea or vomiting. Reports minimal bleeding. Would like to go home today.    Objective:   /65 (BP Location: Left arm)   Pulse 60   Temp 98.4  F (36.9  C) (Oral)   Resp 15   Ht 1.7 m (5' 6.93\")   Wt 65.7 kg (144 lb 13.5 oz)   SpO2 99%   BMI 22.73 kg/m      EXAM:   General: resting, in NAD  CV: regular rate  Resp: nonlabored breathing on room air  Abdomen: soft, nondistended. steristrips in place, minimal old dried blood, overall c/d/i and healing well    Labs/Imaging:  Results for orders placed or performed during the hospital encounter of 20 (from the past 24 hour(s))   Hemoglobin   Result Value Ref Range    Hemoglobin 10.2 (L) 11.7 - 15.7 g/dL   Glucose whole blood   Result Value Ref Range    Glucose 101 (H) 70 - 99 mg/dL     Assessment: Jatinder Guaman is a 34 year old female  POD#2 after exploratory laparotomy, abdominal myomectomy, and scar revision. Doing well overall.     Plan:    FEN: regular diet, scheduled senna BID. S/p bisacodyl suppository    Pain: well-controlled  Heme: postop hgb of 10.2   :  voiding spontaneously  PPx: encouraged ambulation    Disposition:  Anticipate discharge home today    Kala Bertrand MD  Obstetrics and Gynecology PGY-1  20 644    I have seen and examined the patient without the resident. I have reviewed, edited, and agree with the note.   My findings are:appears well, resting comfortably  Abdomen soft, NT ND  Incision CDI  Advised on placement of abd binder  Hemoglobin   Date Value Ref Range Status   2020 10.2 (L) 11.7 - 15.7 g/dL Final   2020 11.6 (L) 11.7 - 15.7 g/dL Final     Ynes Garcia MD    "

## 2020-02-20 NOTE — PLAN OF CARE
Pt. discharged at 445pm via car to home.  Pt. was accompanied by spouse, and left with personal belongings.  Prior to discharge, PIV was removed.  Pt. received complete discharge paperwork and all medications as filled by discharge pharmacy.  Pt. was given times of last dose for all discharge medications in writing on discharge medication sheets.  Discharge teaching included times of medication, pain management, activity restrictions, dressing changes, and signs and symptoms of infection.  Pt. to follow up with OBGYN on Mar. 18th.  Pt. had no further questions at the time of discharge and no unmet needs were identified.

## 2020-02-20 NOTE — PLAN OF CARE
"VS:    BP 99/61 (BP Location: Left arm)   Pulse 58   Temp 98.1  F (36.7  C) (Oral)   Resp 16   Ht 1.7 m (5' 6.93\")   Wt 65.1 kg (143 lb 9.6 oz)   SpO2 99%   BMI 22.54 kg/m         VSS. BP's soft but pt is asymptomatic. Denies any CP/SOB. O2 sats upper 90's on RA. LS clear   Output:    Voiding adequate amounts without difficulty. BS+/flatus+. LBM 2/19  Scant spotting on elis-pad   Activity:    SBA w/walker. ABD binder on for comfort, but off when pt in bed   Skin: No issues except incision   Pain:    Pain in incisional area managed w/oxy 5mg, ice packs and abd binder   Neuro/CMS:    A&Ox4. Cms+   Dressing(s):    Incision ALBERTINA, approximated, no swelling, redness or drainage   Diet:    Reg diet, tolerating w/good appetite, no n/v   LDA:    No iv access   Equipment:        Plan:    Discharge to home when these evening when SO returns after work   Additional Info:          "

## 2020-02-20 NOTE — PLAN OF CARE
"      VS:   BP 97/52 (BP Location: Left arm)   Pulse 60   Temp 99.5  F (37.5  C) (Oral)   Resp 16   Ht 1.7 m (5' 6.93\")   Wt 65.7 kg (144 lb 13.5 oz)   SpO2 99%   BMI 22.73 kg/m    Room air. BP runs low, pt is asymptomatic. Encouraging fluids   Output:   Pt voiding good amounts, passing gas, bowel sounds good   Lungs Lung sounds clear and equal bilaterally   Activity:   Up with standby assist and walker. Pt up to walk in hallway this shift   Skin: WDL ex incision.   Pain:   Controled with scheduled tylenol and Ibuprofen. PRN oxy given x1 this shift   Neuro/CMS:   CMS intact, denies N/T, A&O x4   Dressing(s):   prima pore dressing changed this shift per order, CDI   Diet:   Regular diet, tolerating fair   LDA:   PIV saline locked   Equipment:   Walker and call light in use, television   Plan:   Continue to monitor pain, encourage walking to resolve gas pain.   Additional Info:          "

## 2020-02-20 NOTE — PLAN OF CARE
"      VS:   /65 (BP Location: Left arm)   Pulse 60   Temp 98.4  F (36.9  C) (Oral)   Resp 15   Ht 1.7 m (5' 6.93\")   Wt 65.7 kg (144 lb 13.5 oz)   SpO2 99%   BMI 22.73 kg/m       Output:   Pt voiding without difficulty, passing small amounts of gas, no BM   Activity:   SBA with walker   Skin: Intact ex incision   Pain:   Abdominal/gasl pain managed with scheduled Tylenol and PRN Oxycodone   Neuro/CMS:   A&O x4, denies any numbness or tingling   Dressing(s):   Prima pore dressing CDI   Diet:   Regular diet   LDA:   R PIV SL   Equipment:   Walker, abdominal binder   Plan:   Continue plan of care, pt able to make needs known, call light within reach    Additional Info:   Encourage ambulation to resolve gas pain       "

## 2020-02-21 ENCOUNTER — TELEPHONE (OUTPATIENT)
Dept: OBGYN | Facility: CLINIC | Age: 35
End: 2020-02-21

## 2020-02-21 NOTE — TELEPHONE ENCOUNTER
Received call from pt spouse stating they were instructed to call if John had increased drainage and she is having an increase in the drainage both vaginally and from the incision.    Tried to reach John spouse at [phone] but received voicemail.  Left message that nurse is returning call.  Can call the after clinic hours line at [phone] and leave a message for the doctor to call you back and you can discuss your concerns as the clinic is now closed.

## 2020-02-23 ENCOUNTER — TELEPHONE (OUTPATIENT)
Dept: OBGYN | Facility: CLINIC | Age: 35
End: 2020-02-23

## 2020-02-26 ENCOUNTER — TELEPHONE (OUTPATIENT)
Dept: OBGYN | Facility: CLINIC | Age: 35
End: 2020-02-26

## 2020-02-26 NOTE — TELEPHONE ENCOUNTER
Called patient again to schedule incision check. Pt states she is no longer having drainage and does not need appt. Denies bleeding, foul odor, drainage, swelling, fever, new onset pain. Advised pt to call if develops any concerning symptoms.    Pt expressed understanding and agrees with plan.

## 2020-02-26 NOTE — TELEPHONE ENCOUNTER
----- Message from Cony Mayen RN sent at 2/24/2020  8:09 AM CST -----  Regarding: FW: wound check  Left message for patient to call back. Can get in with Tripp this afternoon.    -Cony  ----- Message -----  From: Kaitlynn Huntley MD  Sent: 2/23/2020   5:27 PM CST  To: [facility]  Subject: wound check                                      Could someone please reach out and arrange incision check on 2/24/20?  Please?  Thanks!  c

## 2020-02-27 LAB — COPATH REPORT: NORMAL

## 2020-03-10 ENCOUNTER — TELEPHONE (OUTPATIENT)
Dept: OBGYN | Facility: CLINIC | Age: 35
End: 2020-03-10

## 2020-03-10 NOTE — TELEPHONE ENCOUNTER
Message left on triage voicemail by patient's  who gave brief description of patient's concern for some drainage/discharge.    Returned call and voicemail left for John to call to triage to discuss.

## 2020-03-17 LAB
ALPHA GLOBIN (HBA1 AND HBA2) DEL/DUP: NORMAL
ALPHA THALASSEMIA HBA1 AND HBA2 SEQ: NORMAL
BETA GLOBIN (HBB) DEL/DUP: NORMAL
BETA GLOBIN FULL GENE SEQUENCING: NORMAL
HEMOGLOBIN LEPORE (HBD/HBB) 3 MUTATIONS: NORMAL
HGB A1 MFR BLD: 97.3 % (ref 95–97.9)
HGB A2 MFR BLD: 2.4 % (ref 2–3.5)
HGB C MFR BLD: 0 % (ref 0–0)
HGB E MFR BLD: 0 % (ref 0–0)
HGB F MFR BLD: 0.3 % (ref 0–2.1)
HGB FRACT BLD ELPH-IMP: NORMAL
HGB OTHER MFR BLD: 1.9 % (ref 0–0)
HGB S BLD QL SOLY: NORMAL
HGB S MFR BLD: 0 % (ref 0–0)
NARRATIVE DIAGNOSTIC REPORT-IMP: NORMAL
PATH INTERP BLD-IMP: NORMAL

## 2020-03-19 ENCOUNTER — OFFICE VISIT (OUTPATIENT)
Dept: OBGYN | Facility: CLINIC | Age: 35
End: 2020-03-19
Attending: OBSTETRICS & GYNECOLOGY
Payer: COMMERCIAL

## 2020-03-19 VITALS
DIASTOLIC BLOOD PRESSURE: 75 MMHG | HEIGHT: 66 IN | SYSTOLIC BLOOD PRESSURE: 118 MMHG | WEIGHT: 145.7 LBS | BODY MASS INDEX: 23.42 KG/M2 | HEART RATE: 72 BPM

## 2020-03-19 DIAGNOSIS — Z98.890 S/P MYOMECTOMY: Primary | ICD-10-CM

## 2020-03-19 PROCEDURE — G0463 HOSPITAL OUTPT CLINIC VISIT: HCPCS | Mod: ZF

## 2020-03-19 ASSESSMENT — MIFFLIN-ST. JEOR: SCORE: 1382.64

## 2020-03-19 NOTE — PROGRESS NOTES
"[facility]    CC: post op visit    S: 34 year old here for post op check from abdominal myomectomy. Overall doing well since surgery. Initially had some drainage from incision which has resolved. Pain controlled, still taking ibuprofen. Has not had menses yet. Bowels and bladder are functioning normally.     O: /75 (BP Location: Right arm, Patient Position: Chair)   Pulse 72   Ht 1.676 m (5' 6\")   Wt 66.1 kg (145 lb 11.2 oz)   BMI 23.52 kg/m    General: No distress  Abdomen: Soft, non-tender, non-distended, no masses  Incision: Well healed vertical midline incision    A:34 year old here for post op check    P: Reviewed pathology and surgical findings. Unable to remove lateral fibroid. Will need CS for future pregnancy.   Follow up as needed    Celia King MD FACOG  "

## 2020-03-19 NOTE — LETTER
"3/19/2020       RE: Jatinder Guaman  [address]  [place] [address]     Dear Colleague,    Thank you for referring your patient, Jatinder Guaman, to the [facility] at [facility]. Please see a copy of my visit note below.    [facility] Visit    CC: post op visit    S: 34 year old here for post op check from abdominal myomectomy. Overall doing well since surgery. Initially had some drainage from incision which has resolved. Pain controlled, still taking ibuprofen. Has not had menses yet. Bowels and bladder are functioning normally.     O: /75 (BP Location: Right arm, Patient Position: Chair)   Pulse 72   Ht 1.676 m (5' 6\")   Wt 66.1 kg (145 lb 11.2 oz)   BMI 23.52 kg/m    General: No distress  Abdomen: Soft, non-tender, non-distended, no masses  Incision: Well healed vertical midline incision    A:34 year old here for post op check    P: Reviewed pathology and surgical findings. Unable to remove lateral fibroid. Will need CS for future pregnancy.   Follow up as needed    Celia King MD FACOG    Again, thank you for allowing me to participate in the care of your patient.      Sincerely,    Celia King MD      "

## 2020-03-31 ENCOUNTER — TELEPHONE (OUTPATIENT)
Dept: OBGYN | Facility: CLINIC | Age: 35
End: 2020-03-31

## 2020-03-31 DIAGNOSIS — D50.8 OTHER IRON DEFICIENCY ANEMIA: ICD-10-CM

## 2020-03-31 DIAGNOSIS — Z98.890 S/P MYOMECTOMY: ICD-10-CM

## 2020-03-31 DIAGNOSIS — N93.9 ABNORMAL UTERINE BLEEDING: Primary | ICD-10-CM

## 2020-03-31 RX ORDER — MEDROXYPROGESTERONE ACETATE 10 MG
10 TABLET ORAL DAILY
Qty: 90 TABLET | Refills: 3 | Status: SHIPPED | OUTPATIENT
Start: 2020-03-31

## 2020-03-31 NOTE — TELEPHONE ENCOUNTER
Received call from patient's  reporting that his wife is post op about 1.5 months from myomectomy. She is having increased bleeding and passing large clots. Also noticing more pain from this bleeding. Denies any fever. She states she is not soaking through a pad hourly but changing every time she uses the bathroom. Discussed with MD team. They would like patient to get CBC and start Provera daily until COVID crisis is over and patient can return to clinic for evaluation. Patient's  is in agreement with the plan. He will bring her to the lab tomorrow and will have her start the Provera daily. Instructed him to call back if no improvement in symptoms.

## 2020-04-01 DIAGNOSIS — Z98.890 S/P MYOMECTOMY: ICD-10-CM

## 2020-04-01 DIAGNOSIS — N93.9 ABNORMAL UTERINE BLEEDING: ICD-10-CM

## 2020-04-01 DIAGNOSIS — D50.8 OTHER IRON DEFICIENCY ANEMIA: ICD-10-CM

## 2020-04-01 LAB
ERYTHROCYTE [DISTWIDTH] IN BLOOD BY AUTOMATED COUNT: 19.7 % (ref 10–15)
HCT VFR BLD AUTO: 39.9 % (ref 35–47)
HGB BLD-MCNC: 12.7 G/DL (ref 11.7–15.7)
MCH RBC QN AUTO: 24.7 PG (ref 26.5–33)
MCHC RBC AUTO-ENTMCNC: 31.8 G/DL (ref 31.5–36.5)
MCV RBC AUTO: 78 FL (ref 78–100)
PLATELET # BLD AUTO: 223 10E9/L (ref 150–450)
RBC # BLD AUTO: 5.14 10E12/L (ref 3.8–5.2)
WBC # BLD AUTO: 4.9 10E9/L (ref 4–11)

## 2020-04-01 PROCEDURE — 36415 COLL VENOUS BLD VENIPUNCTURE: CPT | Performed by: OBSTETRICS & GYNECOLOGY

## 2020-04-01 PROCEDURE — 85027 COMPLETE CBC AUTOMATED: CPT | Performed by: OBSTETRICS & GYNECOLOGY

## 2020-05-28 DIAGNOSIS — J45.20 MILD INTERMITTENT ASTHMA WITHOUT COMPLICATION: ICD-10-CM

## 2020-06-02 RX ORDER — ALBUTEROL SULFATE 90 UG/1
AEROSOL, METERED RESPIRATORY (INHALATION)
Qty: 1 INHALER | Refills: 1 | Status: SHIPPED | OUTPATIENT
Start: 2020-06-02 | End: 2021-07-16

## 2020-06-12 DIAGNOSIS — D50.8 OTHER IRON DEFICIENCY ANEMIA: Primary | ICD-10-CM

## 2020-06-12 RX ORDER — FERROUS SULFATE 325(65) MG
325 TABLET ORAL
Qty: 90 TABLET | Refills: 3 | Status: SHIPPED | OUTPATIENT
Start: 2020-06-12 | End: 2021-11-30

## 2021-01-20 NOTE — NURSING NOTE
Addended by: FOUZIA SORIANO on: 1/20/2021 08:49 AM     Modules accepted: Orders     Chief Complaint   Patient presents with     Follow Up

## 2021-07-14 DIAGNOSIS — J45.20 MILD INTERMITTENT ASTHMA WITHOUT COMPLICATION: ICD-10-CM

## 2021-07-16 RX ORDER — ALBUTEROL SULFATE 90 UG/1
AEROSOL, METERED RESPIRATORY (INHALATION)
Qty: 8.5 G | Refills: 1 | Status: SHIPPED | OUTPATIENT
Start: 2021-07-16

## 2021-07-16 NOTE — TELEPHONE ENCOUNTER
albuterol sulfate HFA 90 mcg/actuation aerosol inhaler  Last Written Prescription Date:  6/2/2020  Last Fill Quantity: 1,   # refills: 1  Last Office Visit : 3/19/2020  Future Office visit:  None    Routing refill request to provider for review/approval because:  Second Request       Over due office visit  Refer to clinic for review       Emani Graham RN  Central Triage Red Flags/Med Refills

## 2021-10-27 ENCOUNTER — TRANSCRIBE ORDERS (OUTPATIENT)
Dept: OTHER | Age: 36
End: 2021-10-27

## 2021-10-27 ENCOUNTER — PATIENT OUTREACH (OUTPATIENT)
Dept: ONCOLOGY | Facility: CLINIC | Age: 36
End: 2021-10-27
Payer: COMMERCIAL

## 2021-10-27 DIAGNOSIS — D50.9 ANEMIA, IRON DEFICIENCY: Primary | ICD-10-CM

## 2021-10-27 NOTE — PROGRESS NOTES
Writer placed call to John to confirm the purpose of calling earlier. Per chart review, she had previously received IV Iron infusions at a facility within [facility] but is not established with any hematologist in the service line. Per chart review, she has not had recent labs that are in CareEverywhere to support need for IV Iron or hematology appointment. Dieteritzel stated that she would need to talk to her  before answering questions about purpose of call and would call back. NPS phone number provided and she was advised that a covering nurse would be able to discuss as writer will be out of the office until Tuesday, November 2.

## 2021-11-05 NOTE — PROGRESS NOTES
Writer received call from John and her spouse, Abrahan. Per Abrahan, John had been traveling to [place] where it is reported that she passed out from anemia. No records on file and no follow up visit has been completed with PCP. Abrahan asking about next steps as she has a history of ISAIAH dating back to 4/2019 when she last saw a PCP at [facility]. Advised Abrahan that she should be seen by a PCP to assess her current needs. Provided a list of labs that would prove helpful for hematology via email to [email or web address] They will follow up with PCP and advise of appointment date/time, advised that I could watch for lab results and we would go from there. Abrahan and John voiced understanding, not other concerns or questions addressed at time of call.

## 2021-11-30 DIAGNOSIS — D50.8 OTHER IRON DEFICIENCY ANEMIA: ICD-10-CM

## 2021-11-30 RX ORDER — FERROUS SULFATE 325(65) MG
325 TABLET ORAL
Qty: 90 TABLET | Refills: 3 | Status: SHIPPED | OUTPATIENT
Start: 2021-11-30

## 2021-12-06 ENCOUNTER — TRANSCRIBE ORDERS (OUTPATIENT)
Dept: OTHER | Age: 36
End: 2021-12-06
Payer: COMMERCIAL

## 2021-12-06 ENCOUNTER — TRANSCRIBE ORDERS (OUTPATIENT)
Dept: OTOLARYNGOLOGY | Facility: CLINIC | Age: 36
End: 2021-12-06
Payer: COMMERCIAL

## 2021-12-06 DIAGNOSIS — D64.9 ANEMIA: Primary | ICD-10-CM

## 2021-12-06 NOTE — PROGRESS NOTES
Writer placed call back to Anam to advise that it does not appear that we have any updated labs. A PCP visit is reflected in patient chart but notes not updated. Left VM that we still are unable to schedule without more recent labs and provider visit to determine level of need if any. Referral canceled.

## 2022-01-26 ENCOUNTER — TELEPHONE (OUTPATIENT)
Dept: OBGYN | Facility: CLINIC | Age: 37
End: 2022-01-26
Payer: COMMERCIAL

## 2022-01-26 NOTE — TELEPHONE ENCOUNTER
[facility]    Phone Message    May a detailed message be left on voicemail: yes     Reason for Call: The patient and her  called asking to schedule the same procedure she had done on 3/18/22. Patient and  refused to schedule follow up visit first. Please advise. Thank you.    Action Taken: Message routed to:  Other: BECCA RN-[facility]    Travel Screening: Not Applicable

## 2022-02-04 NOTE — TELEPHONE ENCOUNTER
Previous surgery was myomectomy performed on 02/18/2020.    Attempted to reach patient. Will need visit to assess current concerns. Left VM to call back to schedule.

## 2023-07-19 NOTE — TELEPHONE ENCOUNTER
"Returned answering service call for \"post op increased drainage - steri strip concern\".  Patient and partner state there has been a small amount of what sounds like serosanguinous drainage from left aspect of incision, can't see any skin separation.  Drainage is not green, foul smelling.  Some of steri's have started to curl up at edges, are starting to bother patient, wondering if she can take them off.  Stated she could remove steris.  Denies f/c/changes in bowel/bladder habits.  Offered ED visit now to assess drainage.  Patient feels ok to wait until tomorrow.    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

## 2024-03-15 NOTE — LETTER
Problem: Adult Inpatient Plan of Care  Goal: Optimal Comfort and Wellbeing  Outcome: Ongoing, Progressing     Problem: Infection  Goal: Absence of Infection Signs and Symptoms  Outcome: Ongoing, Progressing      "2019       RE: John Guaman  [address]  [place] [address]     Dear Colleague,    Thank you for referring your patient, John Guaman, to the [facility] at [facility]. Please see a copy of my visit note below.    [facility]  Follow-Up Visit    S:Ms. John Guaman is a 33 year old  here for follow-up. She is here today with her  Anam. She initially presented last February to establish care and for preconception counseling. She returns today as she has now been having unprotected intercourse for almost a year (since 2018) and has not had a pregnancy yet. She continues to have regular periods and some symptoms associated with ovulation, though she is unable to tell me what these are. We have discussed timed intercourse and OPKs at previous visits. She has loosely been practicing timed intercourse, though her her  says intercourse is \"more like when the mood hits.\" They were unable to give a better estimation of their frequency of intercourse. She has not used OPKs. Anam had a semen analysis a few months ago, which was normal. He is asking about Viagra for himself or female Viagra.    Without Anam in the room, John shared that she just hasn't been in the mood for intercourse much.  She still feels a lot of stress due to \"family issues\" and being far from home. She has not made many connections in the [place] yet. She is on a summer break from school. She has tried to connect with communities here, especially with those from her country. She has tried counseling, and feels like it was somewhat helpful, but does not want to go back. She does feel safe and cared for at home and tells me she wants to have a baby and that her infertility has contributed to her stress.     O:  /70 (BP Location: Left arm, Patient Position: Chair)   Pulse 58   Ht 1.676 m (5' 6\")   Wt 69.1 kg (152 lb 6.4 oz)   LMP 2019 " (Exact Date)   Breastfeeding? No   BMI 24.60 kg/m     Gen: Well-appearing, NAD  HEENT: Normocephalic, atraumatic  CV:  Regular rate, well-perfused  Pulm: Normal respiratory effort    A/P:  Ms. John Guaman is a 33 year old  here for follow-up.    1. Infertility  - John and Anam have now been having unprotected intercourse for roughly one year, so will complete basic infertility work-up at this point.    Pelvic US 3/14/19, large myomatous uterus with effacement and obscuration of the endometrial stripe. Discussed indication for myomectomy. Will probably schedule in late September/early October after completion of work-up. Consider [facility] referral prior to myomectomy.     Semen analysis: normal    HSG ordered, timing discussed    Day 3 FSH, Day 21 Progesterone, TSH, AMH, Prolactin ordered  - again reiterated importance of timed intercourse to have a spontaneous pregnancy, discussed that I would not able to prescribe Viagra for Anam. Briefly discussed indications for Viagra, and recommended he see a physician to discuss if he is experiencing symptoms     2. Mental health  - counseled John on the interplay between mental health and sexual function. I stressed the importance of treating underlying issues as opposed to proceeding with a medication to help with sexual desire.  - we discussed options of medication for mental health, but she declined due to sexual side effects  - discussed options of trying counseling again, possibly with a different provider. Resources provided for finding one.     Return to clinic following HSG and labs to discuss next steps, likely [facility] referral and abdominal myomectomy.    Staffed with Dr. Almaguer.     Rochelle Carrillo MD  Ob/Gyn, PGY-3  2019, 1:40 PM  The Patient was seen in [facility] by ROCHELLE CARRILLO.  I reviewed the history & exam. Assessment and plan were jointly made.    Shaunna Almaguer MD

## 2025-05-19 ENCOUNTER — LAB (OUTPATIENT)
Dept: LAB | Facility: CLINIC | Age: 40
End: 2025-05-19
Attending: STUDENT IN AN ORGANIZED HEALTH CARE EDUCATION/TRAINING PROGRAM
Payer: COMMERCIAL

## 2025-05-19 ENCOUNTER — OFFICE VISIT (OUTPATIENT)
Dept: OBGYN | Facility: CLINIC | Age: 40
End: 2025-05-19
Attending: STUDENT IN AN ORGANIZED HEALTH CARE EDUCATION/TRAINING PROGRAM
Payer: COMMERCIAL

## 2025-05-19 VITALS
SYSTOLIC BLOOD PRESSURE: 119 MMHG | BODY MASS INDEX: 24.17 KG/M2 | DIASTOLIC BLOOD PRESSURE: 67 MMHG | WEIGHT: 163.2 LBS | HEIGHT: 69 IN | HEART RATE: 66 BPM

## 2025-05-19 DIAGNOSIS — N93.9 ABNORMAL UTERINE BLEEDING: ICD-10-CM

## 2025-05-19 DIAGNOSIS — Z98.890 S/P MYOMECTOMY: ICD-10-CM

## 2025-05-19 DIAGNOSIS — Z31.41 FERTILITY TESTING: ICD-10-CM

## 2025-05-19 DIAGNOSIS — D50.0 IRON DEFICIENCY ANEMIA DUE TO CHRONIC BLOOD LOSS: ICD-10-CM

## 2025-05-19 DIAGNOSIS — Z12.4 CERVICAL CANCER SCREENING: ICD-10-CM

## 2025-05-19 DIAGNOSIS — Z98.890 S/P EXPLORATORY LAPAROTOMY: ICD-10-CM

## 2025-05-19 DIAGNOSIS — N89.8 VAGINAL DISCHARGE: ICD-10-CM

## 2025-05-19 DIAGNOSIS — N93.9 ABNORMAL UTERINE BLEEDING: Primary | ICD-10-CM

## 2025-05-19 LAB
BACTERIAL VAGINOSIS VAG-IMP: POSITIVE
CANDIDA DNA VAG QL NAA+PROBE: NOT DETECTED
CANDIDA GLABRATA / CANDIDA KRUSEI DNA: NOT DETECTED
ERYTHROCYTE [DISTWIDTH] IN BLOOD BY AUTOMATED COUNT: 18.8 % (ref 10–15)
HCG UR QL: NEGATIVE
HCT VFR BLD AUTO: 39.3 % (ref 35–47)
HGB BLD-MCNC: 12 G/DL (ref 11.7–15.7)
MCH RBC QN AUTO: 23.8 PG (ref 26.5–33)
MCHC RBC AUTO-ENTMCNC: 30.5 G/DL (ref 31.5–36.5)
MCV RBC AUTO: 78 FL (ref 78–100)
MIS SERPL-MCNC: 0.06 NG/ML (ref 0.15–7.5)
PLATELET # BLD AUTO: 336 10E3/UL (ref 150–450)
RBC # BLD AUTO: 5.04 10E6/UL (ref 3.8–5.2)
T VAGINALIS DNA VAG QL NAA+PROBE: NOT DETECTED
TSH SERPL DL<=0.005 MIU/L-ACNC: 1.81 UIU/ML (ref 0.3–4.2)
WBC # BLD AUTO: 4.2 10E3/UL (ref 4–11)

## 2025-05-19 PROCEDURE — 3078F DIAST BP <80 MM HG: CPT | Performed by: STUDENT IN AN ORGANIZED HEALTH CARE EDUCATION/TRAINING PROGRAM

## 2025-05-19 PROCEDURE — 3074F SYST BP LT 130 MM HG: CPT | Performed by: STUDENT IN AN ORGANIZED HEALTH CARE EDUCATION/TRAINING PROGRAM

## 2025-05-19 PROCEDURE — 84443 ASSAY THYROID STIM HORMONE: CPT

## 2025-05-19 PROCEDURE — 87102 FUNGUS ISOLATION CULTURE: CPT | Performed by: STUDENT IN AN ORGANIZED HEALTH CARE EDUCATION/TRAINING PROGRAM

## 2025-05-19 PROCEDURE — 36415 COLL VENOUS BLD VENIPUNCTURE: CPT

## 2025-05-19 PROCEDURE — 81515 NFCT DS BV&VAGINITIS DNA ALG: CPT | Performed by: STUDENT IN AN ORGANIZED HEALTH CARE EDUCATION/TRAINING PROGRAM

## 2025-05-19 PROCEDURE — 99213 OFFICE O/P EST LOW 20 MIN: CPT | Mod: 25 | Performed by: STUDENT IN AN ORGANIZED HEALTH CARE EDUCATION/TRAINING PROGRAM

## 2025-05-19 PROCEDURE — 85041 AUTOMATED RBC COUNT: CPT

## 2025-05-19 PROCEDURE — 99204 OFFICE O/P NEW MOD 45 MIN: CPT | Performed by: STUDENT IN AN ORGANIZED HEALTH CARE EDUCATION/TRAINING PROGRAM

## 2025-05-19 PROCEDURE — 82166 ASSAY ANTI-MULLERIAN HORM: CPT

## 2025-05-19 PROCEDURE — G0145 SCR C/V CYTO,THINLAYER,RESCR: HCPCS | Performed by: STUDENT IN AN ORGANIZED HEALTH CARE EDUCATION/TRAINING PROGRAM

## 2025-05-19 PROCEDURE — 87624 HPV HI-RISK TYP POOLED RSLT: CPT | Performed by: STUDENT IN AN ORGANIZED HEALTH CARE EDUCATION/TRAINING PROGRAM

## 2025-05-19 PROCEDURE — 81025 URINE PREGNANCY TEST: CPT

## 2025-05-19 RX ORDER — TRANEXAMIC ACID 650 MG/1
1300 TABLET ORAL 3 TIMES DAILY
Qty: 40 TABLET | Refills: 3 | Status: SHIPPED | OUTPATIENT
Start: 2025-05-19

## 2025-05-19 NOTE — PROGRESS NOTES
[facility]  Gynecology Visit      HPI:    Jatinder Guaman is a 39 year old , here to reestablish care    - Infertility work-up in  demonstrated large fibroid uterus, and HSG was not able to confirm tubal patency due to distortion from fibroids. Abdominal myomectomy in 2020.    Since then:      Constant pain on the left side for the last week, has had it since myomectomy in , was previously coming and going  Not associated with menses  Has cramping with menses   Takes cramping tablets      Periods are still regular. No pregnancies. Very heavy. Stays in bed for 1-2 days.      Discharge -- fishy smell, color, sometimes a little burn. Had positive BV, candida in March. Took a single pill for the yeast infection, and antibiotic orally for one week.      Recently had iron infusion and finished iron pills. Had anemia with hemoglobin of 9.           GYN History  - LMP: Patient's last menstrual period was 2025 (approximate).  - Menses: regular , heavy  - Pap Smears:   Last pap NIL, HPV neg 2019  - Contraception: none      OBHx  OB History    Para Term  AB Living   1 0 0 0 1 0   SAB IAB Ectopic Multiple Live Births   0 0 0 0 0      # Outcome Date GA Lbr Antwon/2nd Weight Sex Type Anes PTL Lv   1 AB                PMHx:   Past Medical History:   Diagnosis Date    NO ACTIVE PROBLEMS        PSHx:   Past Surgical History:   Procedure Laterality Date    APPENDECTOMY      DILATION AND CURETTAGE      GYN SURGERY      MYOMECTOMY UTERUS N/A 2020    Procedure: Exploratory Laparotomy and Myomectomy;  Surgeon: Vannesa Leung MD;  Location:  OR       Meds:   None      Allergies:     Allergies   Allergen Reactions    Aspirin Hives         SocHx:   Social History     Tobacco Use    Smoking status: Every Day     Types: Cigarettes    Smokeless tobacco: Never    Tobacco comments:     trying to quit maybe 5 a day   Substance Use Topics    Alcohol use: Yes     Comment: 3 a week     "Drug use: No         FamHx:    Family History   Problem Relation Age of Onset    GI problems Mother         ulcer    GI problems Father         ulcer         ROS: 10-Point ROS negative except as noted in HPI    Physical Exam  /67   Pulse 66   Ht 1.747 m (5' 8.78\")   Wt 74 kg (163 lb 3.2 oz)   LMP 2025 (Approximate)   BMI 24.25 kg/m    Gen: Well-appearing, NAD  HEENT: Normocephalic, atraumatic  CV:  Regular rate  Pulm: Normal respiratory effort  Abd: Soft, non-tender, non-distended, keloid vertical midline incision   Ext: No LE edema, extremities warm and well perfused    Pelvic:  Normal appearing external female genitalia. Normal hair distribution. Vagina is without lesions. Minimal discharge. Cervix nulliparous, several likely Nabothian cysts. Uterus is 15-wk size due to palpable fibroids, fairly mobile, non-tender. Minimal left pelvic tenderness.       Assessment/Plan:  John Guaman is a 39 year old  female here to reestablish care for several issues.    Fibroid uterus  2. Abnormal uterine bleeding  3. Pelvic pain  4. Iron deficiency anemia  - TXA to decrease bleeding (TTC so will not use hormones at this time)  - TVUS to evaluate fibroids and assess cause of pelvic pain  - CBC to check response to Iron infusion    5. Cervical cancer screening  - pap/HPV test    6. Vaginal discharge  - treated for yeast, BV in 3/2025, still having sx, collected multiplex PCR, yeast cx    7. Infertility  - update AMH, TSH now  - HSG given previous inability to establish tubal patency leading to myomectomy, never had follow-up HSG done    Return to clinic in 4-6 weeks.    Rochelle Carrillo MD  Ob/Gyn  [facility]  [facility]    "

## 2025-05-19 NOTE — NURSING NOTE
Chief Complaint   Patient presents with    Follow Up     Myomectomy in 2020    Vaginal Discharge    Abdominal Cramping

## 2025-05-19 NOTE — PROGRESS NOTES
"Constant pain on the left side for the last week, has had it since myomectomy in 2020, was previously coming and going  Not associated with menses  Has cramping with menses   Takes cramping tablets     Periods are still regular. No pregnancies. Very heavy. Stays in bed for 1-2 days.     Discharge -- fishy smell, color, sometimes a little burn. Had positive BV, candida in March. Took a single pill for the yeast infection, and antibiotic orally for one week.     Recently had iron infusion and finished iron pills. Had anemia with hemoglobin of 9.     /67   Pulse 66   Ht 1.747 m (5' 8.78\")   Wt 74 kg (163 lb 3.2 oz)   LMP 05/05/2025 (Approximate)   BMI 24.25 kg/m      Txa***   "

## 2025-05-19 NOTE — PATIENT INSTRUCTIONS
Thank you for trusting us with your care!   Please be aware, if you are on Mychart, you may see your results prior to your providers review. If labs are abnormal, we will call or message you on [employer] with a follow up plan.    If you need to contact us for questions about:  Symptoms, Scheduling & Medical Questions; Non-urgent (2-3 day response) "[employer] message, Urgent (needing response today) [phone] (if after 3:30pm next day response)   Prescriptions: Please call your Pharmacy   Billing: [facility] [phone] or [facility]:[phone]

## 2025-05-19 NOTE — LETTER
2025       RE: Jatinder Guaman  [address]  [facility] [address]     Dear Colleague,    Thank you for referring your patient, Jatinder Guaman, to the [facility] [place] at [facility]. Please see a copy of my visit note below.    [facility]  Gynecology Visit      HPI:    Jatinder Guaman is a 39 year old , here to reestablish care    - Infertility work-up in  demonstrated large fibroid uterus, and HSG was not able to confirm tubal patency due to distortion from fibroids. Abdominal myomectomy in 2020.    Since then:      Constant pain on the left side for the last week, has had it since myomectomy in , was previously coming and going  Not associated with menses  Has cramping with menses   Takes cramping tablets      Periods are still regular. No pregnancies. Very heavy. Stays in bed for 1-2 days.      Discharge -- fishy smell, color, sometimes a little burn. Had positive BV, candida in March. Took a single pill for the yeast infection, and antibiotic orally for one week.      Recently had iron infusion and finished iron pills. Had anemia with hemoglobin of 9.           GYN History  - LMP: Patient's last menstrual period was 2025 (approximate).  - Menses: regular , heavy  - Pap Smears:   Last pap NIL, HPV neg 2019  - Contraception: none      OBHx  OB History    Para Term  AB Living   1 0 0 0 1 0   SAB IAB Ectopic Multiple Live Births   0 0 0 0 0      # Outcome Date GA Lbr Antwon/2nd Weight Sex Type Anes PTL Lv   1 AB                PMHx:   Past Medical History:   Diagnosis Date     NO ACTIVE PROBLEMS        PSHx:   Past Surgical History:   Procedure Laterality Date     APPENDECTOMY       DILATION AND CURETTAGE       GYN SURGERY       MYOMECTOMY UTERUS N/A 2020    Procedure: Exploratory Laparotomy and Myomectomy;  Surgeon: Vannesa Leung MD;  Location:  OR  "      Meds:   None      Allergies:     Allergies   Allergen Reactions     Aspirin Hives         SocHx:   Social History     Tobacco Use     Smoking status: Every Day     Types: Cigarettes     Smokeless tobacco: Never     Tobacco comments:     trying to quit maybe 5 a day   Substance Use Topics     Alcohol use: Yes     Comment: 3 a week     Drug use: No         FamHx:    Family History   Problem Relation Age of Onset     GI problems Mother         ulcer     GI problems Father         ulcer         ROS: 10-Point ROS negative except as noted in HPI    Physical Exam  /67   Pulse 66   Ht 1.747 m (5' 8.78\")   Wt 74 kg (163 lb 3.2 oz)   LMP 2025 (Approximate)   BMI 24.25 kg/m    Gen: Well-appearing, NAD  HEENT: Normocephalic, atraumatic  CV:  Regular rate  Pulm: Normal respiratory effort  Abd: Soft, non-tender, non-distended, keloid vertical midline incision   Ext: No LE edema, extremities warm and well perfused    Pelvic:  Normal appearing external female genitalia. Normal hair distribution. Vagina is without lesions. Minimal discharge. Cervix nulliparous, several likely Nabothian cysts. Uterus is 15-wk size due to palpable fibroids, fairly mobile, non-tender. Minimal left pelvic tenderness.       Assessment/Plan:  John Guaman is a 39 year old  female here to reestablish care for several issues.    Fibroid uterus  2. Abnormal uterine bleeding  3. Pelvic pain  4. Iron deficiency anemia  - TXA to decrease bleeding (TTC so will not use hormones at this time)  - TVUS to evaluate fibroids and assess cause of pelvic pain  - CBC to check response to Iron infusion    5. Cervical cancer screening  - pap/HPV test    6. Vaginal discharge  - treated for yeast, BV in 3/2025, still having sx, collected multiplex PCR, yeast cx    7. Infertility  - update AMH, TSH now  - HSG given previous inability to establish tubal patency leading to myomectomy, never had follow-up HSG done    Return to clinic in - " weeks.    Rochelle Carrillo MD  Ob/Gyn  [facility]  [facility]    Again, thank you for allowing me to participate in the care of your patient.      Sincerely,    Rochelle Carrillo MD

## 2025-05-20 ENCOUNTER — RESULTS FOLLOW-UP (OUTPATIENT)
Dept: OBGYN | Facility: CLINIC | Age: 40
End: 2025-05-20

## 2025-05-20 ENCOUNTER — ANCILLARY PROCEDURE (OUTPATIENT)
Dept: ULTRASOUND IMAGING | Facility: CLINIC | Age: 40
End: 2025-05-20
Attending: STUDENT IN AN ORGANIZED HEALTH CARE EDUCATION/TRAINING PROGRAM
Payer: COMMERCIAL

## 2025-05-20 DIAGNOSIS — Z31.41 FERTILITY TESTING: ICD-10-CM

## 2025-05-20 DIAGNOSIS — N76.0 BACTERIAL VAGINOSIS: Primary | ICD-10-CM

## 2025-05-20 DIAGNOSIS — B96.89 BACTERIAL VAGINOSIS: Primary | ICD-10-CM

## 2025-05-20 DIAGNOSIS — Z98.890 S/P MYOMECTOMY: ICD-10-CM

## 2025-05-20 LAB
HPV HR 12 DNA CVX QL NAA+PROBE: NEGATIVE
HPV16 DNA CVX QL NAA+PROBE: NEGATIVE
HPV18 DNA CVX QL NAA+PROBE: NEGATIVE
HUMAN PAPILLOMA VIRUS FINAL DIAGNOSIS: NORMAL

## 2025-05-20 PROCEDURE — 76856 US EXAM PELVIC COMPLETE: CPT | Mod: 26 | Performed by: STUDENT IN AN ORGANIZED HEALTH CARE EDUCATION/TRAINING PROGRAM

## 2025-05-20 PROCEDURE — 76856 US EXAM PELVIC COMPLETE: CPT

## 2025-05-20 PROCEDURE — 76830 TRANSVAGINAL US NON-OB: CPT | Mod: 26 | Performed by: STUDENT IN AN ORGANIZED HEALTH CARE EDUCATION/TRAINING PROGRAM

## 2025-05-20 RX ORDER — METRONIDAZOLE 500 MG/1
500 TABLET ORAL 2 TIMES DAILY
Qty: 14 TABLET | Refills: 0 | Status: SHIPPED | OUTPATIENT
Start: 2025-05-20 | End: 2025-05-27

## 2025-05-22 LAB
BACTERIA VAG AEROBE CULT: NORMAL
BKR AP ASSOCIATED HPV REPORT: NORMAL
BKR LAB AP GYN ADEQUACY: NORMAL
BKR LAB AP GYN INTERPRETATION: NORMAL
BKR LAB AP LMP: NORMAL
BKR LAB AP PREVIOUS ABNORMAL: NORMAL
PATH REPORT.COMMENTS IMP SPEC: NORMAL
PATH REPORT.COMMENTS IMP SPEC: NORMAL
PATH REPORT.RELEVANT HX SPEC: NORMAL

## 2025-06-09 ENCOUNTER — HOSPITAL ENCOUNTER (OUTPATIENT)
Dept: GENERAL RADIOLOGY | Facility: CLINIC | Age: 40
Discharge: HOME | End: 2025-06-09
Attending: STUDENT IN AN ORGANIZED HEALTH CARE EDUCATION/TRAINING PROGRAM | Admitting: STUDENT IN AN ORGANIZED HEALTH CARE EDUCATION/TRAINING PROGRAM
Payer: COMMERCIAL

## 2025-06-09 DIAGNOSIS — Z98.890 S/P MYOMECTOMY: Primary | ICD-10-CM

## 2025-06-09 LAB — HCG UR QL: NEGATIVE

## 2025-06-09 PROCEDURE — 81025 URINE PREGNANCY TEST: CPT | Performed by: STUDENT IN AN ORGANIZED HEALTH CARE EDUCATION/TRAINING PROGRAM

## 2025-06-09 PROCEDURE — 255N000002 HC RX 255 OP 636: Performed by: STUDENT IN AN ORGANIZED HEALTH CARE EDUCATION/TRAINING PROGRAM

## 2025-06-09 PROCEDURE — 58340 CATHETER FOR HYSTEROGRAPHY: CPT

## 2025-06-09 RX ORDER — IOPAMIDOL 612 MG/ML
30 INJECTION, SOLUTION INTRAVASCULAR ONCE
Status: COMPLETED | OUTPATIENT
Start: 2025-06-09 | End: 2025-06-09

## 2025-06-09 RX ADMIN — IOPAMIDOL 30 ML: 612 INJECTION, SOLUTION INTRAVENOUS at 13:59

## 2025-06-09 NOTE — DISCHARGE INSTRUCTIONS
HSG (Hysterosalpingogram) Discharge Instructions    Diet and medicines:   ___You may go back to your normal diet and medicines.   ___You may take Tylenol (acetaminophen) or Advil (ibuprofen).       Activity: You may go back to your normal routine.    Side effects:  Expect mild cramping today.  You may have bloody spotting or brown, sticky discharge for up to two days.          Call your doctor if you have:    Hives.  Problems breathing.  Swelling  Signs of infection, which include:  A fever over 101  F (38.3  C), taken under the tongue.  Unusual discharge from your vagina.  Pain in lower abdomen (belly).    Questions? Call your hospital:     [facility] [phone]  [facility] [phone]  [facility] [phone]  [facility] [phone]  [facility] [phone]    Patient: ____________________________________  Instructor: __________________________________ Time: ________ Date: __________

## 2025-06-30 ENCOUNTER — RESULTS FOLLOW-UP (OUTPATIENT)
Dept: OBGYN | Facility: CLINIC | Age: 40
End: 2025-06-30

## 2025-06-30 DIAGNOSIS — B96.89 BACTERIAL VAGINOSIS: Primary | ICD-10-CM

## 2025-06-30 DIAGNOSIS — N76.0 BACTERIAL VAGINOSIS: Primary | ICD-10-CM

## 2025-07-03 RX ORDER — CLINDAMYCIN PHOSPHATE 20 MG/G
1 CREAM VAGINAL AT BEDTIME
Qty: 40 G | Refills: 0 | Status: SHIPPED | OUTPATIENT
Start: 2025-07-03 | End: 2025-07-10

## (undated) DEVICE — SU PDS II 0 TP-1 60" Z991G

## (undated) DEVICE — PAD CHUX UNDERPAD 30X36" P3036C

## (undated) DEVICE — PAD PERI INDIV WRAP 11" 2022A

## (undated) DEVICE — ESU ELEC BLADE 6" COATED E1450-6

## (undated) DEVICE — Device

## (undated) DEVICE — DRSG STERI STRIP 1/2X4" R1547

## (undated) DEVICE — SOL NACL 0.9% INJ 250ML BAG 2B1322Q

## (undated) DEVICE — SPONGE LAP 18X18" X8435

## (undated) DEVICE — LINEN GOWN X4 5410

## (undated) DEVICE — SU MONOCRYL 4-0 PS-2 18" UND Y496G

## (undated) DEVICE — DECANTER TRANSFER DEVICE 2008S

## (undated) DEVICE — ESU GROUND PAD UNIVERSAL W/O CORD

## (undated) DEVICE — BARRIER SEPRAFILM 5X6" SINGLE SHEET 4301-02

## (undated) DEVICE — PREP CHLORAPREP 26ML TINTED ORANGE  260815

## (undated) DEVICE — BNDG ABDOMINAL BINDER 9X30-45" 79-89070

## (undated) DEVICE — DRAPE LAVH/LAPAROSCOPY W/POUCH 29474

## (undated) DEVICE — PREP POVIDONE IODINE SCRUB 7.5% 4OZ APL82212

## (undated) DEVICE — STRAP KNEE/BODY 31143004

## (undated) DEVICE — SU MONOCRYL 0 CT-1 36" Y346H

## (undated) DEVICE — SYR 01ML 27GA 0.5" NDL TBC 309623

## (undated) DEVICE — DRAPE UNDER BUTTOCK 8483

## (undated) DEVICE — SUCTION MANIFOLD DORNOCH ULTRA CART UL-CL500

## (undated) DEVICE — SU VICRYL 2-0 CT-1 27" J339H

## (undated) DEVICE — NDL 22GA 1.5"

## (undated) DEVICE — SOL NACL 0.9% IRRIG 1000ML BOTTLE 2F7124

## (undated) DEVICE — CATH TRAY FOLEY SURESTEP 16FR WDRAIN BAG STLK LATEX A300316A

## (undated) DEVICE — NDL SPINAL 22GA 3.5" QUINCKE 405181

## (undated) DEVICE — GLOVE PROTEXIS W/NEU-THERA 6.5  2D73TE65

## (undated) DEVICE — ESU PENCIL W/SMOKE EVAC NEPTUNE STRYKER 0703-046-000

## (undated) DEVICE — DRSG TELFA ISLAND 4X8" 7541

## (undated) DEVICE — PREP SKIN SCRUB TRAY 4461A

## (undated) DEVICE — SYR 10ML LL W/O NDL 302995

## (undated) DEVICE — LIGHT HANDLE X2

## (undated) DEVICE — GLOVE PROTEXIS BLUE W/NEU-THERA 7.0  2D73EB70

## (undated) DEVICE — TUBING SMOKE EVAC 1CMX3M SEA3710

## (undated) DEVICE — SOL WATER IRRIG 1000ML BOTTLE 2F7114

## (undated) DEVICE — PREP POVIDONE IODINE SOLUTION 10% 4OZ BOTTLE 29906-004

## (undated) DEVICE — SYR 10ML FINGER CONTROL W/O NDL 309695

## (undated) DEVICE — LINEN ORTHO PACK 5446

## (undated) DEVICE — SYR BULB IRRIG 50ML LATEX FREE 0035280

## (undated) RX ORDER — CEFAZOLIN SODIUM 1 G/50ML
SOLUTION INTRAVENOUS
Status: DISPENSED
Start: 2020-02-18

## (undated) RX ORDER — ONDANSETRON 2 MG/ML
INJECTION INTRAMUSCULAR; INTRAVENOUS
Status: DISPENSED
Start: 2020-02-18

## (undated) RX ORDER — KETOROLAC TROMETHAMINE 30 MG/ML
INJECTION, SOLUTION INTRAMUSCULAR; INTRAVENOUS
Status: DISPENSED
Start: 2020-02-18

## (undated) RX ORDER — FENTANYL CITRATE 50 UG/ML
INJECTION, SOLUTION INTRAMUSCULAR; INTRAVENOUS
Status: DISPENSED
Start: 2020-02-18

## (undated) RX ORDER — OXYCODONE HYDROCHLORIDE 5 MG/1
TABLET ORAL
Status: DISPENSED
Start: 2020-02-18

## (undated) RX ORDER — HYDROMORPHONE HYDROCHLORIDE 1 MG/ML
INJECTION, SOLUTION INTRAMUSCULAR; INTRAVENOUS; SUBCUTANEOUS
Status: DISPENSED
Start: 2020-02-18

## (undated) RX ORDER — ACETAMINOPHEN 325 MG/1
TABLET ORAL
Status: DISPENSED
Start: 2020-02-18

## (undated) RX ORDER — DEXAMETHASONE SODIUM PHOSPHATE 4 MG/ML
INJECTION, SOLUTION INTRA-ARTICULAR; INTRALESIONAL; INTRAMUSCULAR; INTRAVENOUS; SOFT TISSUE
Status: DISPENSED
Start: 2020-02-18

## (undated) RX ORDER — LIDOCAINE HYDROCHLORIDE 10 MG/ML
INJECTION, SOLUTION EPIDURAL; INFILTRATION; INTRACAUDAL; PERINEURAL
Status: DISPENSED
Start: 2019-08-13

## (undated) RX ORDER — PHENAZOPYRIDINE HYDROCHLORIDE 200 MG/1
TABLET, FILM COATED ORAL
Status: DISPENSED
Start: 2020-02-18

## (undated) RX ORDER — LIDOCAINE HYDROCHLORIDE 20 MG/ML
INJECTION, SOLUTION EPIDURAL; INFILTRATION; INTRACAUDAL; PERINEURAL
Status: DISPENSED
Start: 2020-02-18

## (undated) RX ORDER — PROPOFOL 10 MG/ML
INJECTION, EMULSION INTRAVENOUS
Status: DISPENSED
Start: 2020-02-18

## (undated) RX ORDER — VASOPRESSIN 20 U/ML
INJECTION PARENTERAL
Status: DISPENSED
Start: 2020-02-18